# Patient Record
Sex: FEMALE | Race: BLACK OR AFRICAN AMERICAN
[De-identification: names, ages, dates, MRNs, and addresses within clinical notes are randomized per-mention and may not be internally consistent; named-entity substitution may affect disease eponyms.]

---

## 2017-03-10 ENCOUNTER — RECORD ABSTRACTING (OUTPATIENT)
Age: 57
End: 2017-03-10

## 2017-03-10 DIAGNOSIS — I10 ESSENTIAL (PRIMARY) HYPERTENSION: ICD-10-CM

## 2017-03-10 DIAGNOSIS — Z78.9 OTHER SPECIFIED HEALTH STATUS: ICD-10-CM

## 2017-03-10 DIAGNOSIS — E11.9 TYPE 2 DIABETES MELLITUS W/OUT COMPLICATIONS: ICD-10-CM

## 2017-03-10 DIAGNOSIS — Z82.49 FAMILY HISTORY OF ISCHEMIC HEART DISEASE AND OTHER DISEASES OF THE CIRCULATORY SYSTEM: ICD-10-CM

## 2017-03-10 DIAGNOSIS — Z12.4 ENCOUNTER FOR SCREENING FOR MALIGNANT NEOPLASM OF CERVIX: ICD-10-CM

## 2017-03-10 PROBLEM — Z00.00 ENCOUNTER FOR PREVENTIVE HEALTH EXAMINATION: Status: ACTIVE | Noted: 2017-03-10

## 2017-03-10 RX ORDER — INSULIN ASPART 100 [IU]/ML
INJECTION, SOLUTION INTRAVENOUS; SUBCUTANEOUS
Refills: 0 | Status: ACTIVE | COMMUNITY

## 2017-03-10 RX ORDER — HYDROCHLOROTHIAZIDE 12.5 MG/1
TABLET ORAL
Refills: 0 | Status: ACTIVE | COMMUNITY

## 2019-11-06 ENCOUNTER — OUTPATIENT (OUTPATIENT)
Dept: OUTPATIENT SERVICES | Facility: HOSPITAL | Age: 59
LOS: 1 days | Discharge: HOME | End: 2019-11-06

## 2019-11-06 VITALS
HEART RATE: 68 BPM | SYSTOLIC BLOOD PRESSURE: 113 MMHG | HEIGHT: 64 IN | OXYGEN SATURATION: 100 % | WEIGHT: 182.1 LBS | DIASTOLIC BLOOD PRESSURE: 58 MMHG | RESPIRATION RATE: 20 BRPM

## 2019-11-06 VITALS — SYSTOLIC BLOOD PRESSURE: 184 MMHG | HEART RATE: 80 BPM | DIASTOLIC BLOOD PRESSURE: 80 MMHG | RESPIRATION RATE: 17 BRPM

## 2019-11-06 LAB — GLUCOSE BLDC GLUCOMTR-MCNC: 69 MG/DL — LOW (ref 70–99)

## 2019-11-06 RX ORDER — AMLODIPINE BESYLATE 2.5 MG/1
1 TABLET ORAL
Qty: 0 | Refills: 0 | DISCHARGE

## 2019-11-06 RX ORDER — LEVOTHYROXINE SODIUM 125 MCG
1 TABLET ORAL
Qty: 0 | Refills: 0 | DISCHARGE

## 2019-11-13 DIAGNOSIS — H25.812 COMBINED FORMS OF AGE-RELATED CATARACT, LEFT EYE: ICD-10-CM

## 2019-11-13 DIAGNOSIS — E03.9 HYPOTHYROIDISM, UNSPECIFIED: ICD-10-CM

## 2019-11-13 DIAGNOSIS — E78.00 PURE HYPERCHOLESTEROLEMIA, UNSPECIFIED: ICD-10-CM

## 2019-11-13 DIAGNOSIS — I10 ESSENTIAL (PRIMARY) HYPERTENSION: ICD-10-CM

## 2023-05-16 ENCOUNTER — INPATIENT (INPATIENT)
Facility: HOSPITAL | Age: 63
LOS: 8 days | Discharge: HOME HEALTH | DRG: 917 | End: 2023-05-25
Attending: INTERNAL MEDICINE | Admitting: INTERNAL MEDICINE
Payer: COMMERCIAL

## 2023-05-16 VITALS
WEIGHT: 184.97 LBS | SYSTOLIC BLOOD PRESSURE: 183 MMHG | DIASTOLIC BLOOD PRESSURE: 80 MMHG | RESPIRATION RATE: 14 BRPM | TEMPERATURE: 98 F | HEART RATE: 75 BPM | OXYGEN SATURATION: 98 %

## 2023-05-16 DIAGNOSIS — E16.2 HYPOGLYCEMIA, UNSPECIFIED: ICD-10-CM

## 2023-05-16 PROBLEM — E03.9 HYPOTHYROIDISM, UNSPECIFIED: Chronic | Status: ACTIVE | Noted: 2019-11-06

## 2023-05-16 PROBLEM — E13.10 OTHER SPECIFIED DIABETES MELLITUS WITH KETOACIDOSIS WITHOUT COMA: Chronic | Status: ACTIVE | Noted: 2019-11-06

## 2023-05-16 PROBLEM — E78.00 PURE HYPERCHOLESTEROLEMIA, UNSPECIFIED: Chronic | Status: ACTIVE | Noted: 2019-11-06

## 2023-05-16 PROBLEM — I10 ESSENTIAL (PRIMARY) HYPERTENSION: Chronic | Status: ACTIVE | Noted: 2019-11-06

## 2023-05-16 LAB
ALBUMIN SERPL ELPH-MCNC: 3.3 G/DL — LOW (ref 3.5–5.2)
ALP SERPL-CCNC: 105 U/L — SIGNIFICANT CHANGE UP (ref 30–115)
ALT FLD-CCNC: 66 U/L — HIGH (ref 0–41)
ANION GAP SERPL CALC-SCNC: 10 MMOL/L — SIGNIFICANT CHANGE UP (ref 7–14)
APTT BLD: 35.4 SEC — SIGNIFICANT CHANGE UP (ref 27–39.2)
AST SERPL-CCNC: 73 U/L — HIGH (ref 0–41)
BASE EXCESS BLDV CALC-SCNC: -0.4 MMOL/L — SIGNIFICANT CHANGE UP (ref -2–3)
BASOPHILS # BLD AUTO: 0.02 K/UL — SIGNIFICANT CHANGE UP (ref 0–0.2)
BASOPHILS NFR BLD AUTO: 0.2 % — SIGNIFICANT CHANGE UP (ref 0–1)
BILIRUB SERPL-MCNC: <0.2 MG/DL — SIGNIFICANT CHANGE UP (ref 0.2–1.2)
BUN SERPL-MCNC: 20 MG/DL — SIGNIFICANT CHANGE UP (ref 10–20)
CA-I SERPL-SCNC: 1.28 MMOL/L — SIGNIFICANT CHANGE UP (ref 1.15–1.33)
CALCIUM SERPL-MCNC: 9 MG/DL — SIGNIFICANT CHANGE UP (ref 8.4–10.5)
CHLORIDE SERPL-SCNC: 105 MMOL/L — SIGNIFICANT CHANGE UP (ref 98–110)
CK SERPL-CCNC: 197 U/L — SIGNIFICANT CHANGE UP (ref 0–225)
CO2 SERPL-SCNC: 22 MMOL/L — SIGNIFICANT CHANGE UP (ref 17–32)
CREAT SERPL-MCNC: 0.5 MG/DL — LOW (ref 0.7–1.5)
EGFR: 105 ML/MIN/1.73M2 — SIGNIFICANT CHANGE UP
EOSINOPHIL # BLD AUTO: 0 K/UL — SIGNIFICANT CHANGE UP (ref 0–0.7)
EOSINOPHIL NFR BLD AUTO: 0 % — SIGNIFICANT CHANGE UP (ref 0–8)
GAS PNL BLDV: 137 MMOL/L — SIGNIFICANT CHANGE UP (ref 136–145)
GAS PNL BLDV: SIGNIFICANT CHANGE UP
GAS PNL BLDV: SIGNIFICANT CHANGE UP
GLUCOSE BLDC GLUCOMTR-MCNC: 122 MG/DL — HIGH (ref 70–99)
GLUCOSE BLDC GLUCOMTR-MCNC: 132 MG/DL — HIGH (ref 70–99)
GLUCOSE BLDC GLUCOMTR-MCNC: 173 MG/DL — HIGH (ref 70–99)
GLUCOSE BLDC GLUCOMTR-MCNC: 22 MG/DL — CRITICAL LOW (ref 70–99)
GLUCOSE BLDC GLUCOMTR-MCNC: 246 MG/DL — HIGH (ref 70–99)
GLUCOSE BLDC GLUCOMTR-MCNC: 25 MG/DL — CRITICAL LOW (ref 70–99)
GLUCOSE BLDC GLUCOMTR-MCNC: 31 MG/DL — CRITICAL LOW (ref 70–99)
GLUCOSE BLDC GLUCOMTR-MCNC: 59 MG/DL — LOW (ref 70–99)
GLUCOSE BLDC GLUCOMTR-MCNC: 63 MG/DL — LOW (ref 70–99)
GLUCOSE SERPL-MCNC: 148 MG/DL — HIGH (ref 70–99)
HCO3 BLDV-SCNC: 24 MMOL/L — SIGNIFICANT CHANGE UP (ref 22–29)
HCT VFR BLD CALC: 31.1 % — LOW (ref 37–47)
HCT VFR BLDA CALC: 30 % — LOW (ref 39–51)
HGB BLD CALC-MCNC: 10.1 G/DL — LOW (ref 12.6–17.4)
HGB BLD-MCNC: 9.9 G/DL — LOW (ref 12–16)
IMM GRANULOCYTES NFR BLD AUTO: 0.4 % — HIGH (ref 0.1–0.3)
INR BLD: 0.94 RATIO — SIGNIFICANT CHANGE UP (ref 0.65–1.3)
LACTATE BLDV-MCNC: 0.8 MMOL/L — SIGNIFICANT CHANGE UP (ref 0.5–2)
LACTATE SERPL-SCNC: 1.9 MMOL/L — SIGNIFICANT CHANGE UP (ref 0.7–2)
LYMPHOCYTES # BLD AUTO: 1.04 K/UL — LOW (ref 1.2–3.4)
LYMPHOCYTES # BLD AUTO: 12.6 % — LOW (ref 20.5–51.1)
MCHC RBC-ENTMCNC: 26.9 PG — LOW (ref 27–31)
MCHC RBC-ENTMCNC: 31.8 G/DL — LOW (ref 32–37)
MCV RBC AUTO: 84.5 FL — SIGNIFICANT CHANGE UP (ref 81–99)
MONOCYTES # BLD AUTO: 0.63 K/UL — HIGH (ref 0.1–0.6)
MONOCYTES NFR BLD AUTO: 7.6 % — SIGNIFICANT CHANGE UP (ref 1.7–9.3)
NEUTROPHILS # BLD AUTO: 6.56 K/UL — HIGH (ref 1.4–6.5)
NEUTROPHILS NFR BLD AUTO: 79.2 % — HIGH (ref 42.2–75.2)
NRBC # BLD: 0 /100 WBCS — SIGNIFICANT CHANGE UP (ref 0–0)
PCO2 BLDV: 38 MMHG — LOW (ref 39–42)
PH BLDV: 7.41 — SIGNIFICANT CHANGE UP (ref 7.32–7.43)
PLATELET # BLD AUTO: 244 K/UL — SIGNIFICANT CHANGE UP (ref 130–400)
PMV BLD: 9.8 FL — SIGNIFICANT CHANGE UP (ref 7.4–10.4)
PO2 BLDV: 76 MMHG — SIGNIFICANT CHANGE UP
POTASSIUM BLDV-SCNC: 3.6 MMOL/L — SIGNIFICANT CHANGE UP (ref 3.5–5.1)
POTASSIUM SERPL-MCNC: 5.6 MMOL/L — HIGH (ref 3.5–5)
POTASSIUM SERPL-SCNC: 5.6 MMOL/L — HIGH (ref 3.5–5)
PROT SERPL-MCNC: 6.2 G/DL — SIGNIFICANT CHANGE UP (ref 6–8)
PROTHROM AB SERPL-ACNC: 10.7 SEC — SIGNIFICANT CHANGE UP (ref 9.95–12.87)
RBC # BLD: 3.68 M/UL — LOW (ref 4.2–5.4)
RBC # FLD: 14.6 % — HIGH (ref 11.5–14.5)
SAO2 % BLDV: 97.3 % — SIGNIFICANT CHANGE UP
SODIUM SERPL-SCNC: 137 MMOL/L — SIGNIFICANT CHANGE UP (ref 135–146)
TROPONIN T SERPL-MCNC: <0.01 NG/ML — SIGNIFICANT CHANGE UP
WBC # BLD: 8.28 K/UL — SIGNIFICANT CHANGE UP (ref 4.8–10.8)
WBC # FLD AUTO: 8.28 K/UL — SIGNIFICANT CHANGE UP (ref 4.8–10.8)

## 2023-05-16 PROCEDURE — 97530 THERAPEUTIC ACTIVITIES: CPT | Mod: GP

## 2023-05-16 PROCEDURE — 87186 SC STD MICRODIL/AGAR DIL: CPT

## 2023-05-16 PROCEDURE — 82962 GLUCOSE BLOOD TEST: CPT

## 2023-05-16 PROCEDURE — 82140 ASSAY OF AMMONIA: CPT

## 2023-05-16 PROCEDURE — 0042T: CPT | Mod: MA

## 2023-05-16 PROCEDURE — 99222 1ST HOSP IP/OBS MODERATE 55: CPT

## 2023-05-16 PROCEDURE — 83036 HEMOGLOBIN GLYCOSYLATED A1C: CPT

## 2023-05-16 PROCEDURE — 97116 GAIT TRAINING THERAPY: CPT | Mod: GP

## 2023-05-16 PROCEDURE — 36415 COLL VENOUS BLD VENIPUNCTURE: CPT

## 2023-05-16 PROCEDURE — 82607 VITAMIN B-12: CPT

## 2023-05-16 PROCEDURE — 95819 EEG AWAKE AND ASLEEP: CPT

## 2023-05-16 PROCEDURE — 81003 URINALYSIS AUTO W/O SCOPE: CPT

## 2023-05-16 PROCEDURE — 93010 ELECTROCARDIOGRAM REPORT: CPT

## 2023-05-16 PROCEDURE — 80061 LIPID PANEL: CPT

## 2023-05-16 PROCEDURE — 83735 ASSAY OF MAGNESIUM: CPT

## 2023-05-16 PROCEDURE — 83525 ASSAY OF INSULIN: CPT

## 2023-05-16 PROCEDURE — 76705 ECHO EXAM OF ABDOMEN: CPT

## 2023-05-16 PROCEDURE — 87086 URINE CULTURE/COLONY COUNT: CPT

## 2023-05-16 PROCEDURE — 95816 EEG AWAKE AND DROWSY: CPT | Mod: 26

## 2023-05-16 PROCEDURE — 84443 ASSAY THYROID STIM HORMONE: CPT

## 2023-05-16 PROCEDURE — 70496 CT ANGIOGRAPHY HEAD: CPT | Mod: 26,MA

## 2023-05-16 PROCEDURE — 97110 THERAPEUTIC EXERCISES: CPT | Mod: GP

## 2023-05-16 PROCEDURE — 97162 PT EVAL MOD COMPLEX 30 MIN: CPT | Mod: GP

## 2023-05-16 PROCEDURE — 99223 1ST HOSP IP/OBS HIGH 75: CPT

## 2023-05-16 PROCEDURE — 80048 BASIC METABOLIC PNL TOTAL CA: CPT

## 2023-05-16 PROCEDURE — 86780 TREPONEMA PALLIDUM: CPT

## 2023-05-16 PROCEDURE — 82746 ASSAY OF FOLIC ACID SERUM: CPT

## 2023-05-16 PROCEDURE — 87077 CULTURE AEROBIC IDENTIFY: CPT

## 2023-05-16 PROCEDURE — 71045 X-RAY EXAM CHEST 1 VIEW: CPT

## 2023-05-16 PROCEDURE — 87635 SARS-COV-2 COVID-19 AMP PRB: CPT

## 2023-05-16 PROCEDURE — 70498 CT ANGIOGRAPHY NECK: CPT | Mod: 26,MA

## 2023-05-16 PROCEDURE — 85027 COMPLETE CBC AUTOMATED: CPT

## 2023-05-16 PROCEDURE — 71045 X-RAY EXAM CHEST 1 VIEW: CPT | Mod: 26

## 2023-05-16 PROCEDURE — 80053 COMPREHEN METABOLIC PANEL: CPT

## 2023-05-16 PROCEDURE — 99285 EMERGENCY DEPT VISIT HI MDM: CPT

## 2023-05-16 PROCEDURE — 84681 ASSAY OF C-PEPTIDE: CPT

## 2023-05-16 RX ORDER — LISINOPRIL 2.5 MG/1
40 TABLET ORAL DAILY
Refills: 0 | Status: DISCONTINUED | OUTPATIENT
Start: 2023-05-16 | End: 2023-05-25

## 2023-05-16 RX ORDER — ATORVASTATIN CALCIUM 80 MG/1
80 TABLET, FILM COATED ORAL AT BEDTIME
Refills: 0 | Status: DISCONTINUED | OUTPATIENT
Start: 2023-05-16 | End: 2023-05-23

## 2023-05-16 RX ORDER — INSULIN LISPRO 100/ML
VIAL (ML) SUBCUTANEOUS
Refills: 0 | Status: DISCONTINUED | OUTPATIENT
Start: 2023-05-16 | End: 2023-05-16

## 2023-05-16 RX ORDER — LEVOTHYROXINE SODIUM 125 MCG
112 TABLET ORAL DAILY
Refills: 0 | Status: DISCONTINUED | OUTPATIENT
Start: 2023-05-16 | End: 2023-05-25

## 2023-05-16 RX ORDER — SODIUM CHLORIDE 9 MG/ML
1000 INJECTION, SOLUTION INTRAVENOUS
Refills: 0 | Status: DISCONTINUED | OUTPATIENT
Start: 2023-05-16 | End: 2023-05-17

## 2023-05-16 RX ORDER — DEXTROSE 50 % IN WATER 50 %
25 SYRINGE (ML) INTRAVENOUS ONCE
Refills: 0 | Status: DISCONTINUED | OUTPATIENT
Start: 2023-05-16 | End: 2023-05-17

## 2023-05-16 RX ORDER — DEXTROSE 50 % IN WATER 50 %
50 SYRINGE (ML) INTRAVENOUS ONCE
Refills: 0 | Status: COMPLETED | OUTPATIENT
Start: 2023-05-16 | End: 2023-05-16

## 2023-05-16 RX ORDER — AMLODIPINE BESYLATE 2.5 MG/1
10 TABLET ORAL DAILY
Refills: 0 | Status: DISCONTINUED | OUTPATIENT
Start: 2023-05-16 | End: 2023-05-25

## 2023-05-16 RX ORDER — SODIUM CHLORIDE 9 MG/ML
1000 INJECTION, SOLUTION INTRAVENOUS
Refills: 0 | Status: DISCONTINUED | OUTPATIENT
Start: 2023-05-16 | End: 2023-05-16

## 2023-05-16 RX ORDER — MIDAZOLAM HYDROCHLORIDE 1 MG/ML
2 INJECTION, SOLUTION INTRAMUSCULAR; INTRAVENOUS ONCE
Refills: 0 | Status: DISCONTINUED | OUTPATIENT
Start: 2023-05-16 | End: 2023-05-16

## 2023-05-16 RX ORDER — INSULIN GLARGINE 100 [IU]/ML
30 INJECTION, SOLUTION SUBCUTANEOUS AT BEDTIME
Refills: 0 | Status: DISCONTINUED | OUTPATIENT
Start: 2023-05-16 | End: 2023-05-16

## 2023-05-16 RX ORDER — ENOXAPARIN SODIUM 100 MG/ML
40 INJECTION SUBCUTANEOUS EVERY 24 HOURS
Refills: 0 | Status: DISCONTINUED | OUTPATIENT
Start: 2023-05-16 | End: 2023-05-25

## 2023-05-16 RX ORDER — DEXTROSE 50 % IN WATER 50 %
15 SYRINGE (ML) INTRAVENOUS ONCE
Refills: 0 | Status: DISCONTINUED | OUTPATIENT
Start: 2023-05-16 | End: 2023-05-17

## 2023-05-16 RX ORDER — DEXTROSE 50 % IN WATER 50 %
12.5 SYRINGE (ML) INTRAVENOUS ONCE
Refills: 0 | Status: DISCONTINUED | OUTPATIENT
Start: 2023-05-16 | End: 2023-05-17

## 2023-05-16 RX ORDER — GLUCAGON INJECTION, SOLUTION 0.5 MG/.1ML
1 INJECTION, SOLUTION SUBCUTANEOUS ONCE
Refills: 0 | Status: DISCONTINUED | OUTPATIENT
Start: 2023-05-16 | End: 2023-05-17

## 2023-05-16 RX ADMIN — SODIUM CHLORIDE 50 MILLILITER(S): 9 INJECTION, SOLUTION INTRAVENOUS at 20:24

## 2023-05-16 RX ADMIN — ATORVASTATIN CALCIUM 80 MILLIGRAM(S): 80 TABLET, FILM COATED ORAL at 23:16

## 2023-05-16 RX ADMIN — Medication 50 MILLILITER(S): at 20:11

## 2023-05-16 RX ADMIN — ENOXAPARIN SODIUM 40 MILLIGRAM(S): 100 INJECTION SUBCUTANEOUS at 23:10

## 2023-05-16 RX ADMIN — MIDAZOLAM HYDROCHLORIDE 2 MILLIGRAM(S): 1 INJECTION, SOLUTION INTRAMUSCULAR; INTRAVENOUS at 13:30

## 2023-05-16 RX ADMIN — Medication 50 MILLILITER(S): at 19:50

## 2023-05-16 RX ADMIN — Medication 50 MILLILITER(S): at 17:02

## 2023-05-16 RX ADMIN — SODIUM CHLORIDE 100 MILLILITER(S): 9 INJECTION, SOLUTION INTRAVENOUS at 17:02

## 2023-05-16 RX ADMIN — Medication 50 MILLILITER(S): at 22:30

## 2023-05-16 NOTE — ED PROVIDER NOTE - CLINICAL SUMMARY MEDICAL DECISION MAKING FREE TEXT BOX
62 yr old f that presents with hypoglycemia, concern for CVA vs seizure. labs, ekg, imaging, neuro at bedside. consider admission. Labs and EKG were ordered and reviewed.  Imaging was ordered and reviewed by me.  Appropriate medications for patient's presenting complaints were ordered and effects were reassessed.  Patient's records (prior hospital, ED visit, and/or nursing home notes if available) were reviewed.  Additional history was obtained from EMS, family, and/or PCP (where available).  Escalation to admission/observation was considered.  Patient requires inpatient hospitalization -telemetry

## 2023-05-16 NOTE — CONSULT NOTE ADULT - ASSESSMENT
Assessment:  62y Female with PMHx of DM, hypothyroidism who presented to the ED due to unresponsiveness in the setting of hypoglycemia, stroke code called in the ED due to confusion even with improvement of blood sugar. The patient's  saw her normal at 5am this morning when he checked her blood sugar which was 80 gave her insulin & levothyroxine and when he came back at 1130am she was unresponsive and foaming from the mouth. NIHSS 5 for level of alertness, orientation, commands, dysarthria, right gaze spontaneously overcome. 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 Tenecteplase not administered as patient out of the window and based on history- more likely seizure in the setting of hypoglycemia.    Recommendations:  - routine EEG   - seizure precautions  - no further stroke workup warranted at this time    Case discussed with Dr. Calix.

## 2023-05-16 NOTE — H&P ADULT - ASSESSMENT
HPI: 62y Female with PMHx of Advanced Dementia, DM, hypothyroidism, HTN who presented to the ED due to unresponsiveness in the setting of hypoglycemia, stroke code called in the ED due to confusion even with improvement of blood sugar. The patient's  saw her normal at 5am this morning when he checked her blood sugar which was 80 gave her insulin & levothyroxine and when he came back at 1130am she was unresponsive and foaming from the mouth. Checked her blood sugar which was in the 30s at this time so he called 911. When EMS arrived patient was diaphoretic and cold to the touch, FS was 47 at this time, therefore, dextrose and orange juice were given. Patient became more awake, however, confused and agitated.  states that she has had issues with hypoglycemia in the past, however, this is the most unresponsive she has been secondary to hypoglycemia. No history of seizures.   On arrival to the ED, patient moving all extremities antigravity with right gaze preference which spontaneously overcome. Patient required 2mg of Versed to tolerate CT scan.      In the ER:  Vitals: /80, HR 75, RR 14, T 36.8 oral, SpO2 98% RA  EKG: Sinus Rythm with 1st degree AV block   Sig Labs: Hgb 9.9, Cr 0.5, Trop -ve   Imagin) CXR Left lung opacification is felt to be artifactual in nature. However, a true frontal x-ray is recommended for further evaluation  2) CT Head + Angio: No acute infarct     # Syncope in setting of hypoglycemia   # IDDM  # Hypothyroidism   # HTN  # Advanced Dementia   - Patient clinically and hemodynamically stable on admission   - Baseline AOx2, seeking placement from family's end   - PT consult,  consult   - Workup negative for acute stroke   - EEG per neurology   - C/w home insulin 30 at bedtime + SS  - A1C, TSH, Lipid Profile in AM   - Fall precautions   - C/w home amlodipine 10, Levothyroxine 112, Lisinopril 40, and atorvastatin 80 QD      # DVT prophylaxis - Lovenox 40 QD   # GI prophylaxis  - Not Indicated   # Diet - DASH/TLC/CC  # Activity Score (AM-PAC) - IAT  # Code status - Full   # Disposition

## 2023-05-16 NOTE — ED ADULT TRIAGE NOTE - CHIEF COMPLAINT QUOTE
pt co confusion and hypoglycemia, on scene 48 d50 given came up to 168, in triage 192 . pt continues to be confused , LKW 5 am code stroke initiated in triage

## 2023-05-16 NOTE — H&P ADULT - NSHPPHYSICALEXAM_GEN_ALL_CORE
Constitutional: confused, no acute distress  HEENT: Airway patent, moist MM,   CV: regular rate, regular rhythm, well-perfused extremities,   Lungs: Clear to ascultation bilaterally, no increased work of breathing.  ABD: Non-tender, Non-distended,   MSK: Neck supple, nontender, normal range of motion, moving all extremities spontaneously  INTEG: Skin warm, dry, no rash.  NEURO: AAO x 2 to person and place, (+) slurred speech, no facial asymmetry, negative pronator drift, R gaze palsy,

## 2023-05-16 NOTE — ED PROVIDER NOTE - PROGRESS NOTE DETAILS
AE: Additional history provided by .  cares for the patient; she is unable to complete ADLs. Patient was found to have glucose of 80 last night and was then given 4 units of insulin and dinner but the patient did not eat much. This morning after the  returned from work, he found her to be less responsive and called EMS who found her to be hypoglycemic. ER: pts , Araceli Stewart can be reached at [phone] AE: Repeat fingerstick is 31. Will give 1 amp D50 and start D5+0.5NS. MAR made aware.

## 2023-05-16 NOTE — H&P ADULT - ATTENDING COMMENTS
***HX and physical limited due to pt being a poor historian. Supplemental information obtained from family, house staff, and EMR      63 YO F w/ a PMH of Advanced Dementia, DM2, hypothyroidism, and HTN who was BIBEMS for eval of nonresponsiveness that started this AM. As per , she was normal this AM and so he administered her Insulin and Levothyroxine at that time. When he returned, pt was altered. Unable to obtain ROS.     Of note, when EMS arrived pt's FS was in 30s and she was given D50.     In the ED, STROKE CODE activation for NIHSS of 5. Imaging wqas negative for acute process. No tPA administered. Neurology is asking for EEG and seizure precautions.     FMHx:   -No family Hx of early cardiac death, CAD, asthma, or genetic disorders identified    Physical exam shows pt in NAD. VSS, afebrile, not hypoxic on RA. A&Ox2 (Name/Place, knows day of the week but not month/year). Neuro exam without deficits, motor/sensory intact, no dysarthria, no facial asymmetry. Muscle strength/sensation intact. CTA B/L with no W/C/R. RRR, no M/G/R. ABD is soft and non-tender, normoactive BSs. LEs without swelling. No rashes. Labs and radiology as above.     Hypoglycemic encephalopathy (resolved) vs hypoglycemic seizure. Currently at baseline. Send EEG. Obtain UA . Send A1c, lipids, and TSH.   -UPDATE at : I was called to bedside by RN. Pt again altered, FS was 22. STAT D50 x2 given. Additional IV access obtained. Started pt on D5+LR at 100 cc/hr. Pt returned to baseline. Hold insulin for now.     Normocytic anemia, unknown baseline. Send anemia work-up. Replace as necessary.     Hypoalbuminemia, from poor oral intake. Nutrition eval.     Hx of Advanced Dementia, hypothyroidism, and HTN. Restart home meds, except as stated above. DVT PPX. Inform PCP of pt's admission to hospital. My note supersedes the residents note.     Date seen by [email or web address]

## 2023-05-16 NOTE — H&P ADULT - HISTORY OF PRESENT ILLNESS
HPI: 62y Female with PMHx of Advanced Dementia, DM, hypothyroidism, HTN who presented to the ED due to unresponsiveness in the setting of hypoglycemia, stroke code called in the ED due to confusion even with improvement of blood sugar. The patient's  saw her normal at 5am this morning when he checked her blood sugar which was 80 gave her insulin & levothyroxine and when he came back at 1130am she was unresponsive and foaming from the mouth. Checked her blood sugar which was in the 30s at this time so he called 911. When EMS arrived patient was diaphoretic and cold to the touch, FS was 47 at this time, therefore, dextrose and orange juice were given. Patient became more awake, however, confused and agitated.  states that she has had issues with hypoglycemia in the past, however, this is the most unresponsive she has been secondary to hypoglycemia. No history of seizures.   On arrival to the ED, patient moving all extremities antigravity with right gaze preference which spontaneously overcome. Patient required 2mg of Versed to tolerate CT scan.      In the ER:  Vitals: /80, HR 75, RR 14, T 36.8 oral, SpO2 98% RA  EKG: Sinus Rythm with 1st degree AV block   Sig Labs: Hgb 9.9, Cr 0.5, Trop -ve   Imagin) CXR Left lung opacification is felt to be artifactual in nature. However, a true frontal x-ray is recommended for further evaluation  2) CT Head + Angio: No acute infarct

## 2023-05-16 NOTE — ED PROVIDER NOTE - ATTENDING CONTRIBUTION TO CARE
62-year-old female with a past medical history significant for hypothyroidism lipidemia hypertension and diabetes who presents with change in mental status.  As per EMS patient's  left for work at approximately 8 5 AM and patient was at her baseline however he returned at 1130 was noted to be unresponsive in bed.  EMS obtained a fingerstick which was 40 given D50 in the field with improvement in mental status.  Patient called as a stroke notification.  Of note in CAT scan patient's confusion was 20 if not following commands became agitated activated given Versed 2 mg.      VITAL SIGNS: I have reviewed nursing notes and confirm.  CONSTITUTIONAL: non-toxic, well appearing  SKIN: no rash, no petechiae.  EYES: EOMI, pink conjunctiva, anicteric  ENT: tongue midline, no exudates, MMM  NECK: Supple; no meningismus, no JVD  CARD: RRR, no murmurs, equal radial pulses bilaterally 2+  RESP: CTAB, no respiratory distress  ABD: Soft, non-tender, non-distended, no peritoneal signs, no HSM, no CVA tenderness  EXT: Normal ROM x4. No edema. No calves tenderness  NEURO: Alert, oriented x2 (person and place)     62 yr old f that presents with hypoglycemia, concern for CVA vs seizure. labs, ekg, imaging, neuro at bedside. consider admission.

## 2023-05-16 NOTE — H&P ADULT - NSICDXPASTMEDICALHX_GEN_ALL_CORE_FT
PAST MEDICAL HISTORY:  Adult hypothyroidism     Asymptomatic hypertension     Atypical diabetes mellitus     Borderline hypercholesterolemia

## 2023-05-16 NOTE — CONSULT NOTE ADULT - SUBJECTIVE AND OBJECTIVE BOX
**STROKE CODE CONSULT NOTE**    Last known well time/Time of onset of symptoms:    HPI: 62y Female with PMHx of     T(C): 36.8 (05-16-23 @ 13:03), Max: 36.8 (05-16-23 @ 13:03)  HR: 76 (05-16-23 @ 13:40) (75 - 76)  BP: 167/67 (05-16-23 @ 13:40) (167/67 - 183/80)  RR: 16 (05-16-23 @ 13:40) (14 - 16)  SpO2: 98% (05-16-23 @ 13:40) (98% - 98%)    PAST MEDICAL & SURGICAL HISTORY:  Asymptomatic hypertension      Borderline hypercholesterolemia      Adult hypothyroidism      Atypical diabetes mellitus          FAMILY HISTORY:      SOCIAL HISTORY:  Denies smoking, drinking, or drug use    ROS: ***  Constitutional: No fever, weight loss or fatigue  Eyes: No eye pain, visual disturbances, or discharge  ENMT:  No difficulty hearing, tinnitus, vertigo; No sinus or throat pain  Neck: No pain or stiffness  Respiratory: No cough, wheezing, chills or hemoptysis  Cardiovascular: No chest pain, palpitations, shortness of breath, dizziness or leg swelling  Gastrointestinal: No abdominal pain. No nausea, vomiting or hematemesis; No diarrhea or constipation. Nohematochezia.  Genitourinary: No dysuria, frequency, hematuria or incontinence  Neurological: As per HPI  Skin: No itching, burning, rashes or lesions   Endocrine: No heat or cold intolerance; No hair loss  Musculoskeletal: No joint pain or swelling; No muscle, back or extremity pain  Psychiatric: No depression, anxiety, mood swings or difficulty sleeping  Heme/Lymph: No easy bruising or bleeding gums    MEDICATIONS  (STANDING):    MEDICATIONS  (PRN):    Allergies    No Known Allergies    Intolerances      Vital Signs Last 24 Hrs  T(C): 36.8 (16 May 2023 13:03), Max: 36.8 (16 May 2023 13:03)  T(F): 98.3 (16 May 2023 13:03), Max: 98.3 (16 May 2023 13:03)  HR: 76 (16 May 2023 13:40) (75 - 76)  BP: 167/67 (16 May 2023 13:40) (167/67 - 183/80)  BP(mean): --  RR: 16 (16 May 2023 13:40) (14 - 16)  SpO2: 98% (16 May 2023 13:40) (98% - 98%)    Parameters below as of 16 May 2023 13:40  Patient On (Oxygen Delivery Method): room air        Physical exam:  General: No acute distress, awake and alert  Cardiovascular: Regular rate and rhythm, no murmurs, rubs, or gallops. No bruits  Pulmonary: Anterior breath sounds clear bilaterally, no crackles or wheezing. No use of accessory muscles  Extremities: Radial and DP pulses +2, no edema    Neurologic:  -Mental status: Awake, alert, oriented to person, place, and time. Speech is fluent with intact naming, repetition, and comprehension, no dysarthria. Recent and remote memory intact. Follows commands. Attention/concentration intact. Fund of knowledge appropriate.  -Cranial nerves:   II: Visual fields are full to confrontation.  III, IV, VI: Extraocular movements are intact without nystagmus. Pupils equally round and reactive to light  V:  Facial sensation V1-V3 equal and intact   VII: Face is symmetric with normal eye closure and smile  VIII: Hearing is bilaterally intact to finger rub  IX, X: Uvula is midline and soft palate rises symmetrically  XI: Head turning and shoulder shrug are intact.  XII: Tongue protrudes midline  Motor: Normal bulk and tone. No pronator drift. Strength bilateral upper extremity 5/5, bilateral lower extremities 5/5.  Rapid alternating movements intact and symmetric  Sensation: Intact to light touch bilaterally. No neglect or extinction on double simultaneous testing.  Coordination: No dysmetria on finger-to-nose and heel-to-shin bilaterally  Reflexes: Downgoing toes bilaterally   Gait: Narrow gait and steady    NIHSS: ****  ICH: *****  GCS: *****  ASPECTS Score: ****    Fingerstick Blood Glucose: CAPILLARY BLOOD GLUCOSE  192 (16 May 2023 14:31)      POCT Blood Glucose.: 152 mg/dL (16 May 2023 13:48)    LABS:                    RADIOLOGY & ADDITIONAL STUDIES:    HCT:   CT Brain Stroke Protocol (05.16.23 @ 13:34) >    IMPRESSION:    1.  Study limited by motion artifact.    2.  No acute intracranial hemorrhage or acute large territorial infarct.    3.  Cerebral atrophy.    4.  Periventricular white matter hypodensities, nonspecific differential   diagnostic possibilities include ischemic change, gliosis or   demyelination.    5.  Prominent sulcus in the left parietal region may represent focal   atrophy or chronic infarct.    CTA:    CTP:      -----------------------------------------------------------------------------------------------------------------  IV-tenecetplase(Y/N):    ***                              Bolus time:  Reason IV-tenecetplase not given:       ASSESSMENT/PLAN:    62y Female w/ PMH ***. HCT showed ***. CTA showed ***. Given *** tenecetplase was ***.         **STROKE CODE CONSULT NOTE**    Last known well time: 5:00am this morning    HPI: 62y Female with PMHx of DM, hypothyroidism who presented to the ED due to unresponsiveness in the setting of hypoglycemia, stroke code called in the ED due to confusion even with improvement of blood sugar. The patient's  saw her normal at 5am this morning when he checked her blood sugar which was 80 gave her insulin & levothyroxine and when he came back at 1130am she was unresponsive and foaming from the mouth. Checked her blood sugar which was in the 30s at this time so he called 911. When EMS arrived patient was diaphoretic and cold to the touch, FS was 47 at this time, therefore, dextrose and orange juice were given. Patient became more awake, however, confused and agitated.  states that she has had issues with hypoglycemia in the past, however, this is the most unresponsive she has been secondary to hypoglycemia. No history of seizures.   On arrival to the ED, patient moving all extremities antigravity with right gaze preference which spontaneously overcome. Patient required 2mg of Versed to tolerate CT scan.      T(C): 36.8 (05-16-23 @ 13:03), Max: 36.8 (05-16-23 @ 13:03)  HR: 76 (05-16-23 @ 13:40) (75 - 76)  BP: 167/67 (05-16-23 @ 13:40) (167/67 - 183/80)  RR: 16 (05-16-23 @ 13:40) (14 - 16)  SpO2: 98% (05-16-23 @ 13:40) (98% - 98%)    PAST MEDICAL & SURGICAL HISTORY:  Asymptomatic hypertension      Borderline hypercholesterolemia      Adult hypothyroidism      Atypical diabetes mellitus          FAMILY HISTORY:      SOCIAL HISTORY:  unknown     ROS:   unable to obtain from patient     MEDICATIONS  (STANDING):    MEDICATIONS  (PRN):    Allergies    No Known Allergies    Intolerances      Vital Signs Last 24 Hrs  T(C): 36.8 (16 May 2023 13:03), Max: 36.8 (16 May 2023 13:03)  T(F): 98.3 (16 May 2023 13:03), Max: 98.3 (16 May 2023 13:03)  HR: 76 (16 May 2023 13:40) (75 - 76)  BP: 167/67 (16 May 2023 13:40) (167/67 - 183/80)  BP(mean): --  RR: 16 (16 May 2023 13:40) (14 - 16)  SpO2: 98% (16 May 2023 13:40) (98% - 98%)    Parameters below as of 16 May 2023 13:40  Patient On (Oxygen Delivery Method): room air        Physical exam:  General: No acute distress, awake and alert    Neurologic:  -Mental status: requires repetitive stimulation to attend, eyes open, intermittently following simple commands, unable to name objects, oriented to person and hospital, disorientated to age and month. Mildly dysarthric.  -Cranial nerves:   II: Visual fields are full to confrontation.  III, IV, VI: prefers right with gaze, however, spontaneously overcomes  V:  Facial sensation V1-V3 equal and intact   VII: Face is symmetric with normal eye closure and smile  Motor:  Strength bilateral upper extremity 5/5, bilateral lower extremities 5/5.  Sensation: Intact to light touch bilaterally. No neglect or extinction on double simultaneous testing.  Coordination: unable to assess as patient does not follow command    NIHSS: 5  ASPECTS Score: 7    Fingerstick Blood Glucose: CAPILLARY BLOOD GLUCOSE  192 (16 May 2023 14:31)      POCT Blood Glucose.: 152 mg/dL (16 May 2023 13:48)    LABS:                    RADIOLOGY & ADDITIONAL STUDIES:    HCT:   CT Brain Stroke Protocol (05.16.23 @ 13:34) >    IMPRESSION:    1.  Study limited by motion artifact.    2.  No acute intracranial hemorrhage or acute large territorial infarct.    3.  Cerebral atrophy.    4.  Periventricular white matter hypodensities, nonspecific differential   diagnostic possibilities include ischemic change, gliosis or   demyelination.    5.  Prominent sulcus in the left parietal region may represent focal   atrophy or chronic infarct.    < from: CT Angio Brain Stroke Protocol  w/ IV Cont (05.16.23 @ 14:01) >    CT PERFUSION:    Tmax greater than 6 seconds 3 mL in the left cerebellar hemisphere may   represent ischemic penumbra, follow-up MRI of the brain is suggested if   not contraindicated in this patient.    CTA HEAD/NECK:    Likely occlusion of the left vertebral artery 2 cm distal to its origin   with reconstitution at the level of C2, two short segments of severe   stenosis involving the intracranial left vertebral artery.    < end of copied text >      -----------------------------------------------------------------------------------------------------------------  IV-tenecetplase(Y/N):    no                          Reason IV-tenecetplase not given: out of the window, more likely seizure in the setting of hypoglycemia

## 2023-05-16 NOTE — ED PROVIDER NOTE - PHYSICAL EXAMINATION
Vital Signs: I have reviewed the initial vital signs.  Constitutional: confused, no acute distress  HEENT: Airway patent, moist MM,   CV: regular rate, regular rhythm, well-perfused extremities,   Lungs: Clear to ascultation bilaterally, no increased work of breathing.  ABD: Non-tender, Non-distended,   MSK: Neck supple, nontender, normal range of motion, moving all extremities spontaneously  INTEG: Skin warm, dry, no rash.  NEURO: AAO x 2 to person and place, (+) slurred speech, no facial asymmetry, negative pronator drift, R gaze palsy,

## 2023-05-16 NOTE — CONSULT NOTE ADULT - NS ATTEND AMEND GEN_ALL_CORE FT
Pt is a 61 yo F with PMHx of DM, hypothyroidism, who presents with hypoglycemia and unresponsiveness, concern for seizure-like activity. POC glucose 36 at home as per pt's . pt appeared post ictal on arrival to ED. CT head without acute process. CTA head/neck with left VA occlusion, likely incidental finding, however recommend ASA/statin. Concern for possible provoked seizure int he setting of severe hypoglycemia. Recommend rEEG, seizure precautions, DM management as per primary team. May consider MRI brain should concern for stroke persist once pt's mentation is improved.

## 2023-05-16 NOTE — ED PROVIDER NOTE - OBJECTIVE STATEMENT
62-year-old female brought in by EMS for evaluation of confusion and hypoglycemia.  Per EMS, patient's  left patient this morning before he went to work at 5 AM she was in her usual state of health.  He arrived at 1130 and noted patient was unresponsive in the bed.  EMS arrived fingerstick 40, IV access obtained and given dextrose.  Mental status improved, repeat fingerstick 190, however patient still not behaving at her baseline, confused and not really following commands.  Code stroke called in triage.

## 2023-05-16 NOTE — H&P ADULT - NSHPLABSRESULTS_GEN_ALL_CORE
9.9    8.28  )-----------( 244      ( 16 May 2023 14:15 )             31.1     05-16    137  |  105  |  20  ----------------------------<  148<H>  5.6<H>   |  22  |  0.5<L>    Ca    9.0      16 May 2023 14:15    TPro  6.2  /  Alb  3.3<L>  /  TBili  <0.2  /  DBili  x   /  AST  73<H>  /  ALT  66<H>  /  AlkPhos  105  05-16    PT/INR - ( 16 May 2023 14:15 )   PT: 10.70 sec;   INR: 0.94 ratio         PTT - ( 16 May 2023 14:15 )  PTT:35.4 sec      Lactate, Blood: 1.9 mmol/L (05-16-23 @ 14:15)  Troponin T, Serum: <0.01 ng/mL (05-16-23 @ 14:15)  Creatine Kinase, Serum: 197 U/L (05-16-23 @ 14:15)          CARDIAC MARKERS ( 16 May 2023 14:15 )  x     / <0.01 ng/mL / 197 U/L / x     / x

## 2023-05-17 LAB
APPEARANCE UR: CLEAR — SIGNIFICANT CHANGE UP
BILIRUB UR-MCNC: NEGATIVE — SIGNIFICANT CHANGE UP
C PEPTIDE SERPL-MCNC: 0.1 NG/ML — LOW (ref 1.1–4.4)
COLOR SPEC: COLORLESS — SIGNIFICANT CHANGE UP
DIFF PNL FLD: NEGATIVE — SIGNIFICANT CHANGE UP
GLUCOSE BLDC GLUCOMTR-MCNC: 107 MG/DL — HIGH (ref 70–99)
GLUCOSE BLDC GLUCOMTR-MCNC: 116 MG/DL — HIGH (ref 70–99)
GLUCOSE BLDC GLUCOMTR-MCNC: 152 MG/DL — HIGH (ref 70–99)
GLUCOSE BLDC GLUCOMTR-MCNC: 158 MG/DL — HIGH (ref 70–99)
GLUCOSE BLDC GLUCOMTR-MCNC: 164 MG/DL — HIGH (ref 70–99)
GLUCOSE BLDC GLUCOMTR-MCNC: 167 MG/DL — HIGH (ref 70–99)
GLUCOSE BLDC GLUCOMTR-MCNC: 182 MG/DL — HIGH (ref 70–99)
GLUCOSE BLDC GLUCOMTR-MCNC: 203 MG/DL — HIGH (ref 70–99)
GLUCOSE BLDC GLUCOMTR-MCNC: 215 MG/DL — HIGH (ref 70–99)
GLUCOSE BLDC GLUCOMTR-MCNC: 252 MG/DL — HIGH (ref 70–99)
GLUCOSE BLDC GLUCOMTR-MCNC: 44 MG/DL — CRITICAL LOW (ref 70–99)
GLUCOSE BLDC GLUCOMTR-MCNC: 45 MG/DL — CRITICAL LOW (ref 70–99)
GLUCOSE BLDC GLUCOMTR-MCNC: 49 MG/DL — CRITICAL LOW (ref 70–99)
GLUCOSE BLDC GLUCOMTR-MCNC: 64 MG/DL — LOW (ref 70–99)
GLUCOSE BLDC GLUCOMTR-MCNC: 67 MG/DL — LOW (ref 70–99)
GLUCOSE BLDC GLUCOMTR-MCNC: 68 MG/DL — LOW (ref 70–99)
GLUCOSE BLDC GLUCOMTR-MCNC: 90 MG/DL — SIGNIFICANT CHANGE UP (ref 70–99)
GLUCOSE BLDC GLUCOMTR-MCNC: 95 MG/DL — SIGNIFICANT CHANGE UP (ref 70–99)
GLUCOSE BLDC GLUCOMTR-MCNC: 96 MG/DL — SIGNIFICANT CHANGE UP (ref 70–99)
GLUCOSE BLDC GLUCOMTR-MCNC: 97 MG/DL — SIGNIFICANT CHANGE UP (ref 70–99)
GLUCOSE BLDC GLUCOMTR-MCNC: 97 MG/DL — SIGNIFICANT CHANGE UP (ref 70–99)
GLUCOSE BLDC GLUCOMTR-MCNC: >600 MG/DL — CRITICAL HIGH (ref 70–99)
GLUCOSE UR QL: ABNORMAL
INSULIN SERPL-MCNC: 4.4 UU/ML — SIGNIFICANT CHANGE UP (ref 2.6–24.9)
KETONES UR-MCNC: NEGATIVE — SIGNIFICANT CHANGE UP
LEUKOCYTE ESTERASE UR-ACNC: NEGATIVE — SIGNIFICANT CHANGE UP
NITRITE UR-MCNC: NEGATIVE — SIGNIFICANT CHANGE UP
PH UR: 6.5 — SIGNIFICANT CHANGE UP (ref 5–8)
PROT UR-MCNC: NEGATIVE — SIGNIFICANT CHANGE UP
SP GR SPEC: 1.02 — SIGNIFICANT CHANGE UP (ref 1.01–1.03)
UROBILINOGEN FLD QL: SIGNIFICANT CHANGE UP

## 2023-05-17 PROCEDURE — 99291 CRITICAL CARE FIRST HOUR: CPT

## 2023-05-17 RX ORDER — NIFEDIPINE 30 MG
10 TABLET, EXTENDED RELEASE 24 HR ORAL ONCE
Refills: 0 | Status: COMPLETED | OUTPATIENT
Start: 2023-05-17 | End: 2023-05-17

## 2023-05-17 RX ORDER — DEXTROSE 50 % IN WATER 50 %
50 SYRINGE (ML) INTRAVENOUS ONCE
Refills: 0 | Status: COMPLETED | OUTPATIENT
Start: 2023-05-17 | End: 2023-05-17

## 2023-05-17 RX ORDER — QUETIAPINE FUMARATE 200 MG/1
12.5 TABLET, FILM COATED ORAL ONCE
Refills: 0 | Status: COMPLETED | OUTPATIENT
Start: 2023-05-17 | End: 2023-05-17

## 2023-05-17 RX ORDER — DEXTROSE 50 % IN WATER 50 %
25 SYRINGE (ML) INTRAVENOUS ONCE
Refills: 0 | Status: COMPLETED | OUTPATIENT
Start: 2023-05-17 | End: 2023-05-17

## 2023-05-17 RX ADMIN — Medication 112 MICROGRAM(S): at 05:15

## 2023-05-17 RX ADMIN — Medication 50 MILLILITER(S): at 07:00

## 2023-05-17 RX ADMIN — QUETIAPINE FUMARATE 12.5 MILLIGRAM(S): 200 TABLET, FILM COATED ORAL at 22:12

## 2023-05-17 RX ADMIN — SODIUM CHLORIDE 125 MILLILITER(S): 9 INJECTION, SOLUTION INTRAVENOUS at 04:55

## 2023-05-17 RX ADMIN — Medication 50 MILLILITER(S): at 01:03

## 2023-05-17 RX ADMIN — ENOXAPARIN SODIUM 40 MILLIGRAM(S): 100 INJECTION SUBCUTANEOUS at 22:12

## 2023-05-17 RX ADMIN — LISINOPRIL 40 MILLIGRAM(S): 2.5 TABLET ORAL at 05:15

## 2023-05-17 RX ADMIN — SODIUM CHLORIDE 75 MILLILITER(S): 9 INJECTION, SOLUTION INTRAVENOUS at 00:56

## 2023-05-17 RX ADMIN — Medication 10 MILLIGRAM(S): at 22:12

## 2023-05-17 RX ADMIN — AMLODIPINE BESYLATE 10 MILLIGRAM(S): 2.5 TABLET ORAL at 05:15

## 2023-05-17 RX ADMIN — ATORVASTATIN CALCIUM 80 MILLIGRAM(S): 80 TABLET, FILM COATED ORAL at 22:12

## 2023-05-17 RX ADMIN — SODIUM CHLORIDE 125 MILLILITER(S): 9 INJECTION, SOLUTION INTRAVENOUS at 11:05

## 2023-05-17 RX ADMIN — SODIUM CHLORIDE 100 MILLILITER(S): 9 INJECTION, SOLUTION INTRAVENOUS at 04:06

## 2023-05-17 RX ADMIN — Medication 25 GRAM(S): at 09:30

## 2023-05-17 RX ADMIN — Medication 50 MILLILITER(S): at 05:07

## 2023-05-17 NOTE — ED ADULT NURSE NOTE - NSFALLHARMRISKINTERV_ED_ALL_ED
Assistance OOB with selected safe patient handling equipment if applicable/Assistance with ambulation/Communicate risk of Fall with Harm to all staff, patient, and family/Monitor gait and stability/Monitor for mental status changes and reorient to person, place, and time, as needed/Provide visual cue: red socks, yellow wristband, yellow gown, etc/Reinforce activity limits and safety measures with patient and family/Toileting schedule using arm’s reach rule for commode and bathroom/Use of alarms - bed, stretcher, chair and/or video monitoring/Bed in lowest position, wheels locked, appropriate side rails in place/Call bell, personal items and telephone in reach/Instruct patient to call for assistance before getting out of bed/chair/stretcher/Non-slip footwear applied when patient is off stretcher/[place] to call system/Physically safe environment - no spills, clutter or unnecessary equipment/Purposeful Proactive Rounding/Room/bathroom lighting operational, light cord in reach

## 2023-05-17 NOTE — PHYSICAL THERAPY INITIAL EVALUATION ADULT - PERTINENT HX OF CURRENT PROBLEM, REHAB EVAL
2y Female with PMHx of Advanced Dementia, DM, hypothyroidism, HTN who presented to the ED due to unresponsiveness in the setting of hypoglycemia, stroke code called in the ED due to confusion even with improvement of blood sugar. The patient's  saw her normal at 5am this morning when he checked her blood sugar which was 80 gave her insulin & levothyroxine and when he came back at 1130am she was unresponsive and foaming from the mouth. Checked her blood sugar which was in the 30s at this time so he called 911. When EMS arrived patient was diaphoretic and cold to the touch, FS was 47 at this time, therefore, dextrose and orange juice were given. Patient became more awake, however, confused and agitated.  states that she has had issues with hypoglycemia in the past, however, this is the most unresponsive she has been secondary to hypoglycemia. No history of seizures.   On arrival to the ED, patient moving all extremities antigravity with right gaze preference which spontaneously overcome. Patient required 2mg of Versed to tolerate CT scan.

## 2023-05-17 NOTE — PATIENT PROFILE ADULT - FALL HARM RISK - HARM RISK INTERVENTIONS

## 2023-05-17 NOTE — PHYSICAL THERAPY INITIAL EVALUATION ADULT - GENERAL OBSERVATIONS, REHAB EVAL
pt seen in CCU. [id number] pm. 63 y/o F received/left in bed, nad, + tele, fluctuating restlessness. pt appears alert and confused, following VC's for simple mobility. oriented to "hospital" and own name, not the date or . pt sat up at EOB with mod A, stood up with mod A, and side stepped 6 ft with mod A, decreased safety awareness and + impulsivity. tends to slide to the left side of the bed. RT gaze preference, but able to visually track to LT when visually prompted. vitals: 144/73 68 99% on RA.

## 2023-05-17 NOTE — CHART NOTE - NSCHARTNOTEFT_GEN_A_CORE
Transfer Note    Transfer from: Med tele  Transfer to: ICU    HPI: 62y Female with PMHx of Advanced Dementia, DM, hypothyroidism, HTN who presented to the ED due to unresponsiveness in the setting of hypoglycemia, stroke code called in the ED due to confusion even with improvement of blood sugar. The patient's  saw her normal at 5am this morning when he checked her blood sugar which was 80 gave her insulin & levothyroxine and when he came back at 1130am she was unresponsive and foaming from the mouth. Checked her blood sugar which was in the 30s at this time so he called 911. When EMS arrived patient was diaphoretic and cold to the touch, FS was 47 at this time, therefore, dextrose and orange juice were given. Patient became more awake, however, confused and agitated.  states that she has had issues with hypoglycemia in the past, however, this is the most unresponsive she has been secondary to hypoglycemia. No history of seizures.   On arrival to the ED, patient moving all extremities antigravity with right gaze preference which spontaneously overcome. Patient required 2mg of Versed to tolerate CT scan.      In the ER:  Vitals: /80, HR 75, RR 14, T 36.8 oral, SpO2 98% RA  EKG: Sinus Rythm with 1st degree AV block   Sig Labs: Hgb 9.9, Cr 0.5, Trop -ve   Imagin) CXR Left lung opacification is felt to be artifactual in nature. However, a true frontal x-ray is recommended for further evaluation  2) CT Head + Angio: No acute infarct     COURSE:  Since admission pt's blood glucose levels consistently low despite multiple amps of D50 and D5 infusion.    Per admission med rec, pt on Novolog at home. Possible accidental exogenous insulin overdose at home prior to presentation.    Placed Endocrine consult  Discontinued Lantus order (pt did not receive any insulin doses while inpatient)  Multiple amps D50 given  [place] serum insulin and c-peptide while FSG was 59 prior to administering subsequent D50 dose.  Started pt on D10 infusion.  Increased D10 infusion to 100cc/hr    Pt upgraded to ICU for q1h FSG checks.      VITAL SIGNS: Last 24 Hours  T(C): 36.6 (16 May 2023 23:59), Max: 36.8 (16 May 2023 13:03)  T(F): 97.8 (16 May 2023 23:59), Max: 98.3 (16 May 2023 13:03)  HR: 60 (16 May 2023 23:59) (60 - 76)  BP: 159/65 (16 May 2023 23:59) (118/59 - 183/80)  BP(mean): 93 (16 May 2023 23:59) (93 - 93)  RR: 18 (16 May 2023 23:59) (14 - 19)  SpO2: 96% (16 May 2023 23:59) (96% - 100%)    MEDICATIONS:  STANDING MEDICATIONS  amLODIPine   Tablet 10 milliGRAM(s) Oral daily  atorvastatin 80 milliGRAM(s) Oral at bedtime  dextrose 10% + sodium chloride 0.45%. 1000 milliLiter(s) IV Continuous <Continuous>  dextrose 5%. 1000 milliLiter(s) IV Continuous <Continuous>  dextrose 5%. 1000 milliLiter(s) IV Continuous <Continuous>  dextrose 50% Injectable 12.5 Gram(s) IV Push once  dextrose 50% Injectable 25 Gram(s) IV Push once  dextrose 50% Injectable 25 Gram(s) IV Push once  enoxaparin Injectable 40 milliGRAM(s) SubCutaneous every 24 hours  glucagon  Injectable 1 milliGRAM(s) IntraMuscular once  levothyroxine 112 MICROGram(s) Oral daily  lisinopril 40 milliGRAM(s) Oral daily    PRN MEDICATIONS  dextrose Oral Gel 15 Gram(s) Oral once PRN    LABS:                        9.9    8.28  )-----------( 244      ( 16 May 2023 14:15 )             31.1     05-16    137  |  105  |  20  ----------------------------<  148<H>  5.6<H>   |  22  |  0.5<L>    Ca    9.0      16 May 2023 14:15    TPro  6.2  /  Alb  3.3<L>  /  TBili  <0.2  /  DBili  x   /  AST  73<H>  /  ALT  66<H>  /  AlkPhos  105  05-16    PT/INR - ( 16 May 2023 14:15 )   PT: 10.70 sec;   INR: 0.94 ratio    PTT - ( 16 May 2023 14:15 )  PTT:35.4 sec    CARDIAC MARKERS ( 16 May 2023 14:15 )  x     / <0.01 ng/mL / 197 U/L / x     / x          RADIOLOGY:  < from: Xray Chest 1 View-PORTABLE IMMEDIATE (Xray Chest 1 View-PORTABLE IMMEDIATE .) (23 @ 14:33) >  Impression:  Left lung opacification is felt to be artifactual in nature.  However, a true frontal x-ray is recommended for further evaluation      < from: CT Angio Brain Stroke Protocol  w/ IV Cont (23 @ 14:01) >  IMPRESSION:  CT PERFUSION:  Tmax greater than 6 seconds 3 mL in the left cerebellar hemisphere may   represent ischemic penumbra, follow-up MRI of the brain is suggested if   not contraindicated in this patient.    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      < from: CT Brain Stroke Protocol (23 @ 13:34) >  IMPRESSION:  1.  Study limited by motion artifact.  2.  No acute intracranial hemorrhage or acute large territorial infarct.  3.  Cerebral atrophy.  4.  Periventricular white matter hypodensities, nonspecific differential diagnostic possibilities include ischemic change, gliosis or demyelination.  5.  Prominent sulcus in the left parietal region may represent focal atrophy or chronic infarct.      ASSESSMENT & PLAN:     #Persistent hypoglycemia, suspected exogenous insulin overdose  - S/p 5 amps D50  - Endocrine consult  - F/u C-peptide, serum insulin levels  - FSGs q1h  - Currently on D10 1/2 NS at 100cc/hr  - D50 pushes and titrate D10 as needed    # Syncope in setting of hypoglycemia   # IDDM  # Hypothyroidism   # HTN  # Advanced Dementia   - Patient clinically and hemodynamically stable on admission   - Baseline AOx2, seeking placement from family's end   - PT consult,  consult   - Workup negative for acute stroke   - EEG per neurology   - C/w home insulin 30 at bedtime + SS  - A1C, TSH, Lipid Profile in AM   - Fall precautions   - C/w home amlodipine 10, Levothyroxine 112, Lisinopril 40, and atorvastatin 80 QD    # DVT prophylaxis - Lovenox 40 QD   # GI prophylaxis  - Not Indicated   # Diet - DASH/TLC/CC  # Activity Score (AM-PAC) - IAT  # Code status - Full   # Disposition     For Follow-Up:  Upgraded for Hypoglycemia  - Endocrine consult  - F/u C-peptide, serum insulin levels  - FSGs q1h  - Currently on D10 1/2 NS at 100cc/hr  - D50 pushes and titrate D10 as needed Transfer Note    Transfer from: Med tele  Transfer to: ICU    HPI: 62y Female with PMHx of Advanced Dementia, DM, hypothyroidism, HTN who presented to the ED due to unresponsiveness in the setting of hypoglycemia, stroke code called in the ED due to confusion even with improvement of blood sugar. The patient's  saw her normal at 5am this morning when he checked her blood sugar which was 80 gave her insulin & levothyroxine and when he came back at 1130am she was unresponsive and foaming from the mouth. Checked her blood sugar which was in the 30s at this time so he called 911. When EMS arrived patient was diaphoretic and cold to the touch, FS was 47 at this time, therefore, dextrose and orange juice were given. Patient became more awake, however, confused and agitated.  states that she has had issues with hypoglycemia in the past, however, this is the most unresponsive she has been secondary to hypoglycemia. No history of seizures.   On arrival to the ED, patient moving all extremities antigravity with right gaze preference which spontaneously overcome. Patient required 2mg of Versed to tolerate CT scan.      In the ER:  Vitals: /80, HR 75, RR 14, T 36.8 oral, SpO2 98% RA  EKG: Sinus Rythm with 1st degree AV block   Sig Labs: Hgb 9.9, Cr 0.5, Trop -ve   Imagin) CXR Left lung opacification is felt to be artifactual in nature. However, a true frontal x-ray is recommended for further evaluation  2) CT Head + Angio: No acute infarct     COURSE:  Since admission pt's blood glucose levels consistently low despite multiple amps of D50 and D5 infusion.    Per admission med rec, pt on Novolog at home. Possible accidental exogenous insulin overdose at home prior to presentation.    Placed Endocrine consult  Discontinued Lantus order (pt did not receive any insulin doses while inpatient)  Multiple amps D50 given  [place] serum insulin and c-peptide while FSG was 59 prior to administering subsequent D50 dose.  Started pt on D10 infusion.  Increased D10 infusion to 125cc/hr    Pt upgraded to ICU for q1h FSG checks.      VITAL SIGNS: Last 24 Hours  T(C): 36.6 (16 May 2023 23:59), Max: 36.8 (16 May 2023 13:03)  T(F): 97.8 (16 May 2023 23:59), Max: 98.3 (16 May 2023 13:03)  HR: 60 (16 May 2023 23:59) (60 - 76)  BP: 159/65 (16 May 2023 23:59) (118/59 - 183/80)  BP(mean): 93 (16 May 2023 23:59) (93 - 93)  RR: 18 (16 May 2023 23:59) (14 - 19)  SpO2: 96% (16 May 2023 23:59) (96% - 100%)    MEDICATIONS:  STANDING MEDICATIONS  amLODIPine   Tablet 10 milliGRAM(s) Oral daily  atorvastatin 80 milliGRAM(s) Oral at bedtime  dextrose 10% + sodium chloride 0.45%. 1000 milliLiter(s) IV Continuous <Continuous>  dextrose 5%. 1000 milliLiter(s) IV Continuous <Continuous>  dextrose 5%. 1000 milliLiter(s) IV Continuous <Continuous>  dextrose 50% Injectable 12.5 Gram(s) IV Push once  dextrose 50% Injectable 25 Gram(s) IV Push once  dextrose 50% Injectable 25 Gram(s) IV Push once  enoxaparin Injectable 40 milliGRAM(s) SubCutaneous every 24 hours  glucagon  Injectable 1 milliGRAM(s) IntraMuscular once  levothyroxine 112 MICROGram(s) Oral daily  lisinopril 40 milliGRAM(s) Oral daily    PRN MEDICATIONS  dextrose Oral Gel 15 Gram(s) Oral once PRN    LABS:                        9.9    8.28  )-----------( 244      ( 16 May 2023 14:15 )             31.1     05-16    137  |  105  |  20  ----------------------------<  148<H>  5.6<H>   |  22  |  0.5<L>    Ca    9.0      16 May 2023 14:15    TPro  6.2  /  Alb  3.3<L>  /  TBili  <0.2  /  DBili  x   /  AST  73<H>  /  ALT  66<H>  /  AlkPhos  105  05-16    PT/INR - ( 16 May 2023 14:15 )   PT: 10.70 sec;   INR: 0.94 ratio    PTT - ( 16 May 2023 14:15 )  PTT:35.4 sec    CARDIAC MARKERS ( 16 May 2023 14:15 )  x     / <0.01 ng/mL / 197 U/L / x     / x          RADIOLOGY:  < from: Xray Chest 1 View-PORTABLE IMMEDIATE (Xray Chest 1 View-PORTABLE IMMEDIATE .) (23 @ 14:33) >  Impression:  Left lung opacification is felt to be artifactual in nature.  However, a true frontal x-ray is recommended for further evaluation      < from: CT Angio Brain Stroke Protocol  w/ IV Cont (23 @ 14:01) >  IMPRESSION:  CT PERFUSION:  Tmax greater than 6 seconds 3 mL in the left cerebellar hemisphere may   represent ischemic penumbra, follow-up MRI of the brain is suggested if   not contraindicated in this patient.    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      < from: CT Brain Stroke Protocol (23 @ 13:34) >  IMPRESSION:  1.  Study limited by motion artifact.  2.  No acute intracranial hemorrhage or acute large territorial infarct.  3.  Cerebral atrophy.  4.  Periventricular white matter hypodensities, nonspecific differential diagnostic possibilities include ischemic change, gliosis or demyelination.  5.  Prominent sulcus in the left parietal region may represent focal atrophy or chronic infarct.      ASSESSMENT & PLAN:     #Persistent hypoglycemia, suspected exogenous insulin overdose  - S/p 6 amps D50  - Endocrine consult  - F/u C-peptide, serum insulin levels  - FSGs q1h  - Currently on D10 1/2 NS at 100cc/hr  - D50 pushes and titrate D10 as needed    # Syncope in setting of hypoglycemia   # IDDM  # Hypothyroidism   # HTN  # Advanced Dementia   - Patient clinically and hemodynamically stable on admission   - Baseline AOx2, seeking placement from family's end   - PT consult,  consult   - Workup negative for acute stroke   - EEG per neurology   - C/w home insulin 30 at bedtime + SS  - A1C, TSH, Lipid Profile in AM   - Fall precautions   - C/w home amlodipine 10, Levothyroxine 112, Lisinopril 40, and atorvastatin 80 QD    # DVT prophylaxis - Lovenox 40 QD   # GI prophylaxis  - Not Indicated   # Diet - DASH/TLC/CC  # Activity Score (AM-PAC) - IAT  # Code status - Full   # Disposition     For Follow-Up:  Upgraded for Hypoglycemia  - Endocrine consult  - F/u C-peptide, serum insulin levels  - FSGs q1h  - Currently on D10 1/2 NS at 125cc/hr  - D50 pushes and titrate D10 as needed Transfer Note    Transfer from: Med tele  Transfer to: ICU    HPI: 62y Female with PMHx of Advanced Dementia, DM, hypothyroidism, HTN who presented to the ED due to unresponsiveness in the setting of hypoglycemia, stroke code called in the ED due to confusion even with improvement of blood sugar. The patient's  saw her normal at 5am this morning when he checked her blood sugar which was 80 gave her insulin & levothyroxine and when he came back at 1130am she was unresponsive and foaming from the mouth. Checked her blood sugar which was in the 30s at this time so he called 911. When EMS arrived patient was diaphoretic and cold to the touch, FS was 47 at this time, therefore, dextrose and orange juice were given. Patient became more awake, however, confused and agitated.  states that she has had issues with hypoglycemia in the past, however, this is the most unresponsive she has been secondary to hypoglycemia. No history of seizures.   On arrival to the ED, patient moving all extremities antigravity with right gaze preference which spontaneously overcome. Patient required 2mg of Versed to tolerate CT scan.      In the ER:  Vitals: /80, HR 75, RR 14, T 36.8 oral, SpO2 98% RA  EKG: Sinus Rythm with 1st degree AV block   Sig Labs: Hgb 9.9, Cr 0.5, Trop -ve   Imagin) CXR Left lung opacification is felt to be artifactual in nature. However, a true frontal x-ray is recommended for further evaluation  2) CT Head + Angio: No acute infarct     COURSE:  Since admission pt's blood glucose levels consistently low despite multiple amps of D50 and D5 infusion.    Per admission med rec, pt on Novolog at home. If cause is exogenous insulin, then not likely due to Novolog given its 2-4 hours of action.    Placed Endocrine consult  Discontinued Lantus order (pt did not receive any insulin doses while inpatient)  Multiple amps D50 given  [place] serum insulin and c-peptide while FSG was 59 prior to administering subsequent D50 dose.  Started pt on D10 infusion.  Increased D10 infusion to 125cc/hr    Pt upgraded to ICU for q1h FSG checks.      VITAL SIGNS: Last 24 Hours  T(C): 36.6 (16 May 2023 23:59), Max: 36.8 (16 May 2023 13:03)  T(F): 97.8 (16 May 2023 23:59), Max: 98.3 (16 May 2023 13:03)  HR: 60 (16 May 2023 23:59) (60 - 76)  BP: 159/65 (16 May 2023 23:59) (118/59 - 183/80)  BP(mean): 93 (16 May 2023 23:59) (93 - 93)  RR: 18 (16 May 2023 23:59) (14 - 19)  SpO2: 96% (16 May 2023 23:59) (96% - 100%)    MEDICATIONS:  STANDING MEDICATIONS  amLODIPine   Tablet 10 milliGRAM(s) Oral daily  atorvastatin 80 milliGRAM(s) Oral at bedtime  dextrose 10% + sodium chloride 0.45%. 1000 milliLiter(s) IV Continuous <Continuous>  dextrose 5%. 1000 milliLiter(s) IV Continuous <Continuous>  dextrose 5%. 1000 milliLiter(s) IV Continuous <Continuous>  dextrose 50% Injectable 12.5 Gram(s) IV Push once  dextrose 50% Injectable 25 Gram(s) IV Push once  dextrose 50% Injectable 25 Gram(s) IV Push once  enoxaparin Injectable 40 milliGRAM(s) SubCutaneous every 24 hours  glucagon  Injectable 1 milliGRAM(s) IntraMuscular once  levothyroxine 112 MICROGram(s) Oral daily  lisinopril 40 milliGRAM(s) Oral daily    PRN MEDICATIONS  dextrose Oral Gel 15 Gram(s) Oral once PRN    LABS:                        9.9    8.28  )-----------( 244      ( 16 May 2023 14:15 )             31.1         137  |  105  |  20  ----------------------------<  148<H>  5.6<H>   |  22  |  0.5<L>    Ca    9.0      16 May 2023 14:15    TPro  6.2  /  Alb  3.3<L>  /  TBili  <0.2  /  DBili  x   /  AST  73<H>  /  ALT  66<H>  /  AlkPhos  105  05-16    PT/INR - ( 16 May 2023 14:15 )   PT: 10.70 sec;   INR: 0.94 ratio    PTT - ( 16 May 2023 14:15 )  PTT:35.4 sec    CARDIAC MARKERS ( 16 May 2023 14:15 )  x     / <0.01 ng/mL / 197 U/L / x     / x          RADIOLOGY:  < from: Xray Chest 1 View-PORTABLE IMMEDIATE (Xray Chest 1 View-PORTABLE IMMEDIATE .) (23 @ 14:33) >  Impression:  Left lung opacification is felt to be artifactual in nature.  However, a true frontal x-ray is recommended for further evaluation      < from: CT Angio Brain Stroke Protocol  w/ IV Cont (23 @ 14:01) >  IMPRESSION:  CT PERFUSION:  Tmax greater than 6 seconds 3 mL in the left cerebellar hemisphere may   represent ischemic penumbra, follow-up MRI of the brain is suggested if   not contraindicated in this patient.    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      < from: CT Brain Stroke Protocol (23 @ 13:34) >  IMPRESSION:  1.  Study limited by motion artifact.  2.  No acute intracranial hemorrhage or acute large territorial infarct.  3.  Cerebral atrophy.  4.  Periventricular white matter hypodensities, nonspecific differential diagnostic possibilities include ischemic change, gliosis or demyelination.  5.  Prominent sulcus in the left parietal region may represent focal atrophy or chronic infarct.      ASSESSMENT & PLAN:     #Persistent hypoglycemia  - S/p 6 amps D50  - Endocrine consult  - F/u C-peptide, serum insulin levels  - FSGs q1h  - Currently on D10 1/2 NS at 100cc/hr  - D50 pushes and titrate D10 as needed    # Syncope in setting of hypoglycemia   # IDDM  # Hypothyroidism   # HTN  # Advanced Dementia   - Patient clinically and hemodynamically stable on admission   - Baseline AOx2, seeking placement from family's end   - PT consult,  consult   - Workup negative for acute stroke   - EEG per neurology   - C/w home insulin 30 at bedtime + SS  - A1C, TSH, Lipid Profile in AM   - Fall precautions   - C/w home amlodipine 10, Levothyroxine 112, Lisinopril 40, and atorvastatin 80 QD    # DVT prophylaxis - Lovenox 40 QD   # GI prophylaxis  - Not Indicated   # Diet - DASH/TLC/CC  # Activity Score (AM-PAC) - IAT  # Code status - Full   # Disposition     For Follow-Up:  Upgraded for Hypoglycemia  - Endocrine consult  - F/u C-peptide, serum insulin levels  - FSGs q1h  - Currently on D10 1/2 NS at 125cc/hr  - D50 pushes and titrate D10 as needed

## 2023-05-17 NOTE — CONSULT NOTE ADULT - SUBJECTIVE AND OBJECTIVE BOX
Patient is a 62y old  Female who presents with a chief complaint of AMS (17 May 2023 05:54)    HPI: 62y Female with PMHx of Advanced Dementia, DM, hypothyroidism, HTN who presented to the ED due to unresponsiveness in the setting of hypoglycemia, stroke code called in the ED due to confusion even with improvement of blood sugar. The patient's  saw her normal at 5am this morning when he checked her blood sugar which was 80 gave her insulin & levothyroxine and when he came back at 1130am she was unresponsive and foaming from the mouth. Checked her blood sugar which was in the 30s at this time so he called 911. When EMS arrived patient was diaphoretic and cold to the touch, FS was 47 at this time, therefore, dextrose and orange juice were given. Patient became more awake, however, confused and agitated.  states that she has had issues with hypoglycemia in the past, however, this is the most unresponsive she has been secondary to hypoglycemia. No history of seizures.   On arrival to the ED, patient moving all extremities antigravity with right gaze preference which spontaneously overcome. Patient required 2mg of Versed to tolerate CT scan. In ED, VSS. EKG: Sinus Rythm with 1st degree AV block. Imaging:  CXR Left lung opacification is felt to be artifactual in nature. However, a true frontal x-ray is recommended for further evaluation;  CT Head + Angio: No acute infarct.  Pt continued to have hypoglycemic episodes after admission and was upgraded to ICU for FS monitoring, remains on D10.  On medication review, pt takes Basaglar 30 U. History cannot be obtained from pt due to lethargy, altered mentation.    FAMILY HISTORY:    PAST MEDICAL & SURGICAL HISTORY:  Asymptomatic hypertension  Borderline hypercholesterolemia  Adult hypothyroidism  Atypical diabetes mellitus    Review of Systems: unreliable    Allergies  No Known Allergies    MEDICATIONS  (STANDING):  amLODIPine   Tablet 10 milliGRAM(s) Oral daily  atorvastatin 80 milliGRAM(s) Oral at bedtime  dextrose 10% + sodium chloride 0.45%. 1000 milliLiter(s) (125 mL/Hr) IV Continuous <Continuous>  dextrose 50% Injectable 25 Gram(s) IV Push once  enoxaparin Injectable 40 milliGRAM(s) SubCutaneous every 24 hours  levothyroxine 112 MICROGram(s) Oral daily  lisinopril 40 milliGRAM(s) Oral daily    MEDICATIONS  (PRN):      Vital Signs Last 24 Hrs  T(C): 36.2 (17 May 2023 09:18), Max: 36.8 (16 May 2023 13:03)  T(F): 97.1 (17 May 2023 09:18), Max: 98.3 (16 May 2023 13:03)  HR: 67 (17 May 2023 09:18) (60 - 76)  BP: 163/79 (17 May 2023 09:18) (112/59 - 183/80)  BP(mean): 114 (17 May 2023 09:18) (93 - 114)  RR: 24 (17 May 2023 09:18) (14 - 24)  SpO2: 98% (17 May 2023 09:18) (96% - 100%)    Parameters below as of 17 May 2023 09:18  Patient On (Oxygen Delivery Method): room air      Height (cm): 160 (05-17 @ 09:18)  Weight (kg): 66.8 (05-17 @ 09:18)  BMI (kg/m2): 26.1 (05-17 @ 09:18)    PHYSICAL EXAM  All physical exam findings normal, except those marked:  General:	Lethargic, arousable, does not answer most questions  Cardiovascular	Borderline nerissa, regular  Respiratory	Normal: no chest wall deformity, normal respiratory pattern, CTA B/L  Abdominal	Normal: soft, ND, NT, bowel sounds present, no masses, no organomegaly  Extremities	Normal: FROM x4  Skin		Appears dry    LABS  VBG - ( 16 May 2023 13:16 )  pH: 7.41  /  pCO2: 38    /  pO2: 76    / HCO3: 24    / Base Excess: -0.4  /  SvO2: 97.3  / Lactate: 0.80                           9.9    8.28  )-----------( 244      ( 16 May 2023 14:15 )             31.1     05-16    137  |  105  |  20  ----------------------------<  148<H>  5.6<H>   |  22  |  0.5<L>    Ca    9.0      16 May 2023 14:15    TPro  6.2  /  Alb  3.3<L>  /  TBili  <0.2  /  DBili  x   /  AST  73<H>  /  ALT  66<H>  /  AlkPhos  105  05-16      Ketone - Urine: Negative (05-17 @ 04:25)    CAPILLARY BLOOD GLUCOSE  192 (16 May 2023 14:31)      POCT Blood Glucose.: 203 mg/dL (17 May 2023 09:56)  POCT Blood Glucose.: 64 mg/dL (17 May 2023 09:21)  POCT Blood Glucose.: 107 mg/dL (17 May 2023 08:58)  POCT Blood Glucose.: 158 mg/dL (17 May 2023 08:03)  POCT Blood Glucose.: 152 mg/dL (17 May 2023 07:23)  POCT Blood Glucose.: 67 mg/dL (17 May 2023 06:51)  POCT Blood Glucose.: 90 mg/dL (17 May 2023 05:47)  POCT Blood Glucose.: 45 mg/dL (17 May 2023 04:52)  POCT Blood Glucose.: 96 mg/dL (17 May 2023 03:43)  POCT Blood Glucose.: 68 mg/dL (17 May 2023 02:40)  POCT Blood Glucose.: 97 mg/dL (17 May 2023 01:49)  POCT Blood Glucose.: 44 mg/dL (17 May 2023 00:56)  POCT Blood Glucose.: 49 mg/dL (17 May 2023 00:53)  POCT Blood Glucose.: 63 mg/dL (16 May 2023 23:43)  POCT Blood Glucose.: 122 mg/dL (16 May 2023 22:37)  POCT Blood Glucose.: 59 mg/dL (16 May 2023 21:51)  POCT Blood Glucose.: 132 mg/dL (16 May 2023 20:50)  POCT Blood Glucose.: 246 mg/dL (16 May 2023 20:03)  POCT Blood Glucose.: 22 mg/dL (16 May 2023 19:41)  POCT Blood Glucose.: 173 mg/dL (16 May 2023 16:51)  POCT Blood Glucose.: 31 mg/dL (16 May 2023 16:43)  POCT Blood Glucose.: 25 mg/dL (16 May 2023 16:41)  POCT Blood Glucose.: 152 mg/dL (16 May 2023 13:48)  POCT Blood Glucose.: 192 mg/dL (16 May 2023 13:07)

## 2023-05-17 NOTE — ED ADULT NURSE REASSESSMENT NOTE - NSFALLHARMRISKINTERV_ED_ALL_ED
Assistance OOB with selected safe patient handling equipment if applicable/Assistance with ambulation/Communicate risk of Fall with Harm to all staff, patient, and family/Monitor gait and stability/Monitor for mental status changes and reorient to person, place, and time, as needed/Provide visual cue: red socks, yellow wristband, yellow gown, etc/Reinforce activity limits and safety measures with patient and family/Toileting schedule using arm’s reach rule for commode and bathroom/Use of alarms - bed, stretcher, chair and/or video monitoring/Bed in lowest position, wheels locked, appropriate side rails in place/Call bell, personal items and telephone in reach/Instruct patient to call for assistance before getting out of bed/chair/stretcher/Non-slip footwear applied when patient is off stretcher/[place] to call system/Physically safe environment - no spills, clutter or unnecessary equipment/Purposeful Proactive Rounding/Room/bathroom lighting operational, light cord in reach

## 2023-05-17 NOTE — CHART NOTE - NSCHARTNOTEFT_GEN_A_CORE
Since admission pt's blood glucose levels consistently low despite multiple amps of D50 and D5 infusion.    Per admission med rec, pt on Novolog at home. Possible accidental exogenous insulin overdose at home prior to presentation.    Placed Endocrine consult  Discontinued Lantus order (pt did not receive any insulin doses while inpatient)  Multiple amps D50 given  [place] serum insulin and c-peptide while FSG was 59 prior to administering subsequent D50 dose.  Started pt on D10 infusion. Since admission pt's blood glucose levels consistently low despite multiple amps of D50 and D5 infusion.    Per admission med rec, pt on Novolog at home. Possible accidental exogenous insulin overdose at home prior to presentation.    Placed Endocrine consult  Discontinued Lantus order (pt did not receive any insulin doses while inpatient)  Multiple amps D50 given  [place] serum insulin and c-peptide while FSG was 59 prior to administering subsequent D50 dose.  Started pt on D10 infusion.  Increased D10 infusion to 100cc/hr    Discussed with Critical Care fellow on call, pt approved for upgrade to ICU care for q1h FSG checks. Since admission pt's blood glucose levels consistently low despite multiple amps of D50 and D5 infusion.    Per admission med rec, pt on Novolog at home. If cause is exogenous insulin, then not likely due to Novolog given its 2-4 hours of action.    Placed Endocrine consult  Discontinued Lantus order (pt did not receive any insulin doses while inpatient)  Multiple amps D50 given  [place] serum insulin and c-peptide while FSG was 59 prior to administering subsequent D50 dose.  Started pt on D10 infusion.  Increased D10 infusion to 100cc/hr    Discussed with Critical Care fellow on call, pt approved for upgrade to ICU care for q1h FSG checks. Since admission pt's blood glucose levels consistently low despite multiple amps of D50 and D5 infusion.    Per admission med rec, pt on Novolog at home. If cause is exogenous insulin, then not likely due to Novolog given its 2-4 hours of action.    Placed Endocrine consult  Discontinued Lantus order (pt did not receive any insulin doses while inpatient)  Multiple amps D50 given  [place] serum insulin and c-peptide while FSG was 59 prior to administering subsequent D50 dose.  Started pt on D10 infusion.  Increased D10 infusion to 125cc/hr    Discussed with Critical Care fellow on call, pt approved for upgrade to ICU care for q1h FSG checks.

## 2023-05-17 NOTE — PATIENT PROFILE ADULT - NSPROPOAPRESSUREINJURY_GEN_A_NUR
Called and spoke with the patient regarding skin biopsy results per Dr. Rey. 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 Explained that this mole did have some abnormal features, and because of this, it is classified as a dysplastic mole, atypical mole or Slim's nevus.  No further treatment is needed at this time.  If the mole grows back or a pigment stain appears within the scar, patient should contact the clinic so the area can be re-examined.     Also, explained that individuals who have these moles may be at somewhat increased risk of developing melanoma compared to the average person.  Therefore, patient should monitor skin for changes in existing moles or the appearance of new moles, and contact the clinic if there are any concerns.    Advised the patient to continue with biopsy site wound care until site was healed, monthly self-skin exams, and to use sun protection- SPF 30 broad spectrum sunscreen, protective clothing, avoiding sunburns and tanning beds.    Patient should also call the clinic if there are:  --Any new or changing spots.  --Any spots that are bleeding, crusting, non-healing or painful.  --Moles that are new, or that have changed in size, shape, border or color.     Patient stated they understood and was very appreciative of the follow up.   no

## 2023-05-17 NOTE — PHYSICAL THERAPY INITIAL EVALUATION ADULT - DIAGNOSIS, PT EVAL
Schedule an appointment and then we can refill  Shruthi Prajapati MD  8/23/2017    Hypoglycemia, gait dysfunction

## 2023-05-17 NOTE — CONSULT NOTE ADULT - ATTENDING COMMENTS
62y Female with PMHx of Advanced Dementia, DM, hypothyroidism, HTN who presented to the ED due to unresponsiveness in the setting of hypoglycemia, stroke code called in the ED due to confusion even with improvement of blood sugar. PAtient is unable to provide a history,  was unreachqable. Plan discussed with DR. Angie mckenna, patient is on basaglar 30 units at home and was advised to take novolog but never started it. As per  sunil mckenna patient was not started on orals as her c- peptide was low 0.9     #Hypoglycemia   - in a patient with Diabetes MEllitus on Insulin, most likely due to accidental insulin overdose  - Home regimen is basaglar 30 units, novolog advised but does not take it    LAst a1c 9.5 in Nov 2022, recommend to obtain a repeat level  - 62y Female with PMHx of Advanced Dementia, DM, hypothyroidism, HTN who presented to the ED due to unresponsiveness in the setting of hypoglycemia, stroke code called in the ED due to confusion even with improvement of blood sugar. PAtient is unable to provide a history,  was unreachqable. Plan discussed with DR. Angie mckenna, patient is on basaglar 30 units at home and was advised to take novolog but never started it. As per  sunil mckenna patient was not started on orals as her c- peptide was low 0.9     #Hypoglycemia   - in a patient with Diabetes MEllitus on Insulin, most likely due to accidental insulin overdose  - Home regimen is basaglar 30 units, novolog advised but does not take it    LAst a1c 9.5 in Nov 2022, recommend to obtain a repeat level  -blood glucose readings trended to 600 on d10, can stop the same, if blood glucose readings remain elevated above 200 off the drip can start with lower dose  lantus 10 units and lispro 1 unit for every 50 mg/dl above 150 mg/dl , tirate as per response  - DC recs to be determined

## 2023-05-17 NOTE — ED ADULT NURSE REASSESSMENT NOTE - NS ED NURSE REASSESS COMMENT FT1
Pt. received from previous RN. Pt. lying on stretcher, breathing with ease on RA. Pt. A&O x4. Pt. on CCM. 18g noted to the R hand; IV intact, no redness/swelling at site. 20g noted to the L FA; IV intact, no redness/swelling at site. D10 1/2NS running at 125mL/hr via Alaris pump. Pt. requires Q1 FS. Awaiting clean bed assignment. Pt. received from previous RN. Pt. lying on stretcher, breathing with ease on RA. Pt. A&O x4. Pt. on CCM. 18g noted to the R hand; IV intact, no redness/swelling at site. 20g noted to the L FA; IV intact, no redness/swelling at site. D10 1/2NS running at 125mL/hr via Alaris pump. Primafit noted. Pt. requires Q1 FS. Awaiting clean bed assignment.

## 2023-05-17 NOTE — CONSULT NOTE ADULT - ASSESSMENT
IMPRESSION:    Altered MS/ TME  severe hypoglycemia ( iatrogenic)  Dementia  HTN    PLAN:    CNS: Neuro fup, ASA/statin, EEG per neuro    HEENT:  Oral care    PULMONARY:  HOB @ 45 degrees, aspiration precaution, on RA    CARDIOVASCULAR: Keep D 10    GI: GI prophylaxis                                          Feeding ENCOURAGE PO feeding    RENAL:  F/u  lytes.  Correct as needed. accurate I/O    INFECTIOUS DISEASE: procal    HEMATOLOGICAL:  DVT prophylaxis.    ENDOCRINE:  Follow up FS.  Insulin protocol if needed. fs Q 1 H    MICU  Possible downgrade in PM

## 2023-05-17 NOTE — ED ADULT NURSE NOTE - OBJECTIVE STATEMENT
Pt is 62 yr female biba c/o hypoglycemia. Pts BS was at 80 at 5am,  then proceeded to give her insulin and her blood sugar had dropped. Pt was confused and disoriented.

## 2023-05-17 NOTE — CONSULT NOTE ADULT - ASSESSMENT
62y Female with PMHx of Advanced Dementia, DM, hypothyroidism, HTN who presented to the ED due to unresponsiveness in the setting of hypoglycemia, upgraded to ICU for persistent hypoglycemia.    *this is an incomplete note, final recs to follow* 62y Female with PMHx of Advanced Dementia, DM, hypothyroidism, HTN who presented to the ED due to unresponsiveness in the setting of hypoglycemia, upgraded to ICU for persistent hypoglycemia.    #Hx of Insulin Dependent DM  #Hypoglycemia  - Spoke to Dr. Ramos, patient's endocrinologist, pt has hx of poorly controlled diabetes and poor compliance  - Last A1c 9.5% in November  - Is ONLY on Basaglar 30 U once daily, was previously prescribed Novolog 6 U premeals but never took it   - Cannot reach  for further history, but per H&P, received Basaglar prior to hypoglycemic event despite a FS of 80 and may have taken or been given higher dose than her usual  - Recurrent hypoglycemia likely due to prolonged effect of Basaglar  - FS now elevated, hold off on further dextrose drip and monitor FS  - Can start low dose Lantus 10 U with sliding scale 1:50 for FS > 150 if FS persistently elevated off of dextrose

## 2023-05-17 NOTE — PHYSICAL THERAPY INITIAL EVALUATION ADULT - PATIENT PROFILE REVIEW, REHAB EVAL
March 3, 2021       Chio Rizo MD  [address] [facility] [address]  Via In Basket      Patient: Mauricio Arnold   YOB: 1967   Date of Visit: 3/3/2021       Dear Dr. Rizo:    Thank you for referring Mauricio Arnold to me for evaluation. Below are my notes for this visit with him.    If you have questions, please do not hesitate to call me. I look forward to following your patient along with you.      Sincerely,        Cayetano To MD        CC: No Recipients  Cayetano To MD  3/3/2021  1:26 PM  Signed    Subjective   Patient ID: Mauricio is a 53 year old male.    Chief Complaint   Patient presents with   • Office Visit     Cardiology   • Follow-up     3 months   • Chest Pain   • Shortness of Breath         HPI:   ====  Cardiac wise doing well  No angina    Cough with wheezing  He had history of COPD/bronchitis  He is coughing up greenish-yellowish sputum  Not much of shortness of breath no PND no orthopnea no ankle edema  Patient is not a smoker    Past Medical History:   Diagnosis Date   • CAD (coronary artery disease)    • Essential (primary) hypertension    • High cholesterol        ALLERGIES:  No Known Allergies     Past Surgical History:   Procedure Laterality Date   • Coronary stent placement         Family History   Problem Relation Age of Onset   • Pacemaker Mother    • Cancer Father        Social History     Tobacco Use   • Smoking status: Former Smoker     Packs/day: 0.00   • Smokeless tobacco: Never Used   Substance Use Topics   • Alcohol use: Never     Frequency: Never   • Drug use: Never        Recent hospitalization:  ====================   None     Review of Systems:   ================    Constitutional:  No chills, and no malaise  Eyes:  No change in eyesight, no pain  Ears, nose, mouth, throat, and face:  No pain, no swelling   Respiratory:  No hemoptysis  Cardiovascular:  No palpitation  Gastrointestinal:  No melena  Genitourinary:  No 
yes
hematuria  Musculoskeletal:  No muscle pain  Neurological:  No change in speech  Behavioral/Psych:  No change in mood  Skin : no itching   =============================================================================    Objective  =========  Vitals:    03/03/21 1301   BP: 136/78   BP Location: LUE - Left upper extremity   Patient Position: Sitting   Cuff Size: Regular   Pulse: 99   Temp: 98.1 °F (36.7 °C)   TempSrc: Temporal   SpO2: 99%   Weight: 91 kg (200 lb 9.9 oz)   Height: 6' (1.829 m)   PainSc:  0        Physical Exam:  =============  General: Alert, cooperative, no distress.  Head: No obvious abnormality  Eyes: Normal, no jaundice.  Throat: Lips, mucosa, moist and normal.  Neck: Supple.  Back: Symmetric.  Lungs: Decreased air entry bilaterally with minimal wheezing in the base but minimal rhonchi  Heart: Regular rate and rhythm, S1, S2 normal.  Abdomen: Soft, non-tender.  Extremities: No edema.  Skin: Normal texture  Neurologic: No clear deficit.    =============================================================================    Data  =====  Stress echocardiogram was done today reviewed    Normal LV systolic function    Patient did not exercise up to 85% of target heart rate no evidence of ischemia the heart rate achieved              ==============================================================================     Assessment :  ============    Coronary artery disease involving native coronary artery of native heart without angina pectoris  (primary encounter diagnosis)    Plan:  =====    We will treat acute bronchitis  Sensitivity specificity of the stress echo was explained to patient  No need for any cardiac work-up for her now  Continue same medication  Low-cholesterol diet Was  discussed with patient in details, low overall calorie, no red meat, no eggs, 2% milk, no butter, use corn oil or  olive oil..    Low-salt diet was emphasized    Exercise : Walking was recommended as preferable form of 
exercise    Compliance with medication  Monitor for any side effect          Orders Placed This Encounter   • CBC No Differential   • Comprehensive Metabolic Panel   • Lipid Panel With Reflex   • predniSONE (DELTASONE) 20 MG tablet   • albuterol 108 (90 Base) MCG/ACT inhaler   • CODEINE-GUAIFENESIN 200-10 MG/5ML Liquid   • azithromycin (ZITHROMAX) 250 MG tablet            Current Outpatient Medications   Medication Sig Dispense Refill   • metoPROLOL tartrate (LOPRESSOR) 25 MG tablet Take 25 mg by mouth.     • lisinopril (ZESTRIL) 20 MG tablet Take 1 tablet by mouth daily. 30 tablet 11   • pantoprazole (PROTONIX) 20 MG tablet Take 1 tablet by mouth daily. 30 tablet 3   • atorvastatin (LIPITOR) 80 MG tablet Take 1 tablet by mouth daily. 30 tablet 6   • clopidogrel (PLAVIX) 75 MG tablet Take 1 tablet by mouth daily. 30 tablet 6   • tamsulosin (FLOMAX) 0.4 MG Cap Take 1 capsule by mouth daily after a meal. 30 capsule 3   • potassium chloride (Klor-Con 10) 10 MEQ ER tablet Take 1 tablet by mouth daily. 90 tablet 3   • predniSONE (DELTASONE) 20 MG tablet Take 1 tablet by mouth 2 times daily. 60 tablet 0   • albuterol 108 (90 Base) MCG/ACT inhaler Inhale 2 puffs into the lungs every 4 hours as needed for Shortness of Breath or Wheezing. 1 Inhaler 11   • CODEINE-GUAIFENESIN 200-10 MG/5ML Liquid Take 15 mLs by mouth 4 times daily as needed (COUGH). 200 mL 0   • azithromycin (ZITHROMAX) 250 MG tablet Take 1 tablet by mouth daily for 5 days. 5 tablet 0   • tadalafil (CIALIS) 20 MG tablet Take 1 tablet by mouth as needed for Erectile Dysfunction. 10 tablet 4     No current facility-administered medications for this visit.           Electronically signed by:  Cayetano To MD, 3/3/2021              
yes

## 2023-05-18 LAB
ALBUMIN SERPL ELPH-MCNC: 3.5 G/DL — SIGNIFICANT CHANGE UP (ref 3.5–5.2)
ALP SERPL-CCNC: 110 U/L — SIGNIFICANT CHANGE UP (ref 30–115)
ALT FLD-CCNC: 45 U/L — HIGH (ref 0–41)
ANION GAP SERPL CALC-SCNC: 8 MMOL/L — SIGNIFICANT CHANGE UP (ref 7–14)
AST SERPL-CCNC: 29 U/L — SIGNIFICANT CHANGE UP (ref 0–41)
BILIRUB SERPL-MCNC: 0.4 MG/DL — SIGNIFICANT CHANGE UP (ref 0.2–1.2)
BUN SERPL-MCNC: 7 MG/DL — LOW (ref 10–20)
CALCIUM SERPL-MCNC: 9.8 MG/DL — SIGNIFICANT CHANGE UP (ref 8.4–10.4)
CHLORIDE SERPL-SCNC: 108 MMOL/L — SIGNIFICANT CHANGE UP (ref 98–110)
CO2 SERPL-SCNC: 27 MMOL/L — SIGNIFICANT CHANGE UP (ref 17–32)
CREAT SERPL-MCNC: 0.5 MG/DL — LOW (ref 0.7–1.5)
EGFR: 105 ML/MIN/1.73M2 — SIGNIFICANT CHANGE UP
GLUCOSE BLDC GLUCOMTR-MCNC: 103 MG/DL — HIGH (ref 70–99)
GLUCOSE BLDC GLUCOMTR-MCNC: 119 MG/DL — HIGH (ref 70–99)
GLUCOSE BLDC GLUCOMTR-MCNC: 139 MG/DL — HIGH (ref 70–99)
GLUCOSE BLDC GLUCOMTR-MCNC: 193 MG/DL — HIGH (ref 70–99)
GLUCOSE BLDC GLUCOMTR-MCNC: 78 MG/DL — SIGNIFICANT CHANGE UP (ref 70–99)
GLUCOSE SERPL-MCNC: 133 MG/DL — HIGH (ref 70–99)
HCT VFR BLD CALC: 35.5 % — LOW (ref 37–47)
HGB BLD-MCNC: 10.8 G/DL — LOW (ref 12–16)
MAGNESIUM SERPL-MCNC: 1.9 MG/DL — SIGNIFICANT CHANGE UP (ref 1.8–2.4)
MCHC RBC-ENTMCNC: 26 PG — LOW (ref 27–31)
MCHC RBC-ENTMCNC: 30.4 G/DL — LOW (ref 32–37)
MCV RBC AUTO: 85.5 FL — SIGNIFICANT CHANGE UP (ref 81–99)
NRBC # BLD: 0 /100 WBCS — SIGNIFICANT CHANGE UP (ref 0–0)
PLATELET # BLD AUTO: 275 K/UL — SIGNIFICANT CHANGE UP (ref 130–400)
PMV BLD: 10.3 FL — SIGNIFICANT CHANGE UP (ref 7.4–10.4)
POTASSIUM SERPL-MCNC: 4.1 MMOL/L — SIGNIFICANT CHANGE UP (ref 3.5–5)
POTASSIUM SERPL-SCNC: 4.1 MMOL/L — SIGNIFICANT CHANGE UP (ref 3.5–5)
PROT SERPL-MCNC: 6.5 G/DL — SIGNIFICANT CHANGE UP (ref 6–8)
RBC # BLD: 4.15 M/UL — LOW (ref 4.2–5.4)
RBC # FLD: 14.6 % — HIGH (ref 11.5–14.5)
SODIUM SERPL-SCNC: 143 MMOL/L — SIGNIFICANT CHANGE UP (ref 135–146)
WBC # BLD: 8.31 K/UL — SIGNIFICANT CHANGE UP (ref 4.8–10.8)
WBC # FLD AUTO: 8.31 K/UL — SIGNIFICANT CHANGE UP (ref 4.8–10.8)

## 2023-05-18 PROCEDURE — 99233 SBSQ HOSP IP/OBS HIGH 50: CPT

## 2023-05-18 RX ORDER — ATORVASTATIN CALCIUM 80 MG/1
40 TABLET, FILM COATED ORAL AT BEDTIME
Refills: 0 | Status: DISCONTINUED | OUTPATIENT
Start: 2023-05-18 | End: 2023-05-18

## 2023-05-18 RX ORDER — DEXTROSE 50 % IN WATER 50 %
15 SYRINGE (ML) INTRAVENOUS ONCE
Refills: 0 | Status: DISCONTINUED | OUTPATIENT
Start: 2023-05-18 | End: 2023-05-25

## 2023-05-18 RX ORDER — DEXTROSE 50 % IN WATER 50 %
25 SYRINGE (ML) INTRAVENOUS ONCE
Refills: 0 | Status: DISCONTINUED | OUTPATIENT
Start: 2023-05-18 | End: 2023-05-25

## 2023-05-18 RX ORDER — SODIUM CHLORIDE 9 MG/ML
1000 INJECTION, SOLUTION INTRAVENOUS
Refills: 0 | Status: DISCONTINUED | OUTPATIENT
Start: 2023-05-18 | End: 2023-05-25

## 2023-05-18 RX ORDER — ASPIRIN/CALCIUM CARB/MAGNESIUM 324 MG
81 TABLET ORAL DAILY
Refills: 0 | Status: DISCONTINUED | OUTPATIENT
Start: 2023-05-18 | End: 2023-05-25

## 2023-05-18 RX ORDER — INSULIN LISPRO 100/ML
VIAL (ML) SUBCUTANEOUS
Refills: 0 | Status: DISCONTINUED | OUTPATIENT
Start: 2023-05-18 | End: 2023-05-25

## 2023-05-18 RX ORDER — QUETIAPINE FUMARATE 200 MG/1
25 TABLET, FILM COATED ORAL ONCE
Refills: 0 | Status: COMPLETED | OUTPATIENT
Start: 2023-05-18 | End: 2023-05-18

## 2023-05-18 RX ORDER — DEXTROSE 50 % IN WATER 50 %
12.5 SYRINGE (ML) INTRAVENOUS ONCE
Refills: 0 | Status: DISCONTINUED | OUTPATIENT
Start: 2023-05-18 | End: 2023-05-25

## 2023-05-18 RX ORDER — CHLORHEXIDINE GLUCONATE 213 G/1000ML
1 SOLUTION TOPICAL
Refills: 0 | Status: DISCONTINUED | OUTPATIENT
Start: 2023-05-18 | End: 2023-05-25

## 2023-05-18 RX ORDER — GLUCAGON INJECTION, SOLUTION 0.5 MG/.1ML
1 INJECTION, SOLUTION SUBCUTANEOUS ONCE
Refills: 0 | Status: DISCONTINUED | OUTPATIENT
Start: 2023-05-18 | End: 2023-05-25

## 2023-05-18 RX ADMIN — ENOXAPARIN SODIUM 40 MILLIGRAM(S): 100 INJECTION SUBCUTANEOUS at 21:01

## 2023-05-18 RX ADMIN — ATORVASTATIN CALCIUM 80 MILLIGRAM(S): 80 TABLET, FILM COATED ORAL at 21:00

## 2023-05-18 RX ADMIN — QUETIAPINE FUMARATE 25 MILLIGRAM(S): 200 TABLET, FILM COATED ORAL at 21:00

## 2023-05-18 RX ADMIN — CHLORHEXIDINE GLUCONATE 1 APPLICATION(S): 213 SOLUTION TOPICAL at 06:33

## 2023-05-18 RX ADMIN — LISINOPRIL 40 MILLIGRAM(S): 2.5 TABLET ORAL at 06:19

## 2023-05-18 RX ADMIN — Medication 1: at 17:50

## 2023-05-18 RX ADMIN — AMLODIPINE BESYLATE 10 MILLIGRAM(S): 2.5 TABLET ORAL at 06:20

## 2023-05-18 RX ADMIN — Medication 81 MILLIGRAM(S): at 11:10

## 2023-05-18 RX ADMIN — Medication 112 MICROGRAM(S): at 06:19

## 2023-05-18 NOTE — PROGRESS NOTE ADULT - SUBJECTIVE AND OBJECTIVE BOX
Patient is a 62y old  Female who presents with a chief complaint of AMS (17 May 2023 10:14)        Over Night Events:    No further hypoglycemia   No fevers   Remains altered         ROS:  See HPI    PHYSICAL EXAM    ICU Vital Signs Last 24 Hrs  T(C): 36.8 (17 May 2023 20:00), Max: 36.8 (17 May 2023 20:00)  T(F): 98.3 (17 May 2023 20:00), Max: 98.3 (17 May 2023 20:00)  HR: 62 (18 May 2023 06:00) (60 - 83)  BP: 145/60 (18 May 2023 06:00) (116/59 - 187/70)  BP(mean): 86 (18 May 2023 06:00) (82 - 122)  ABP: --  ABP(mean): --  RR: 11 (18 May 2023 06:00) (11 - 45)  SpO2: 96% (18 May 2023 06:00) (94% - 100%)    O2 Parameters below as of 18 May 2023 06:00  Patient On (Oxygen Delivery Method): room air            General: NAD   HEENT: DEREK             Lymphatic system: No cervical LN   Lungs: Bilateral BS, clear  Cardiovascular: Regular   Gastrointestinal: Soft, Positive BS  Extremities: No clubbing.  Moves extremities.  Full Range of motion   Skin: Warm, intact  Neurological: No motor or sensory deficit; altered however         LABS:                            10.8   8.31  )-----------( 275      ( 18 May 2023 04:54 )             35.5                                               05-18    143  |  108  |  7<L>  ----------------------------<  133<H>  4.1   |  27  |  0.5<L>    Ca    9.8      18 May 2023 04:54  Mg     1.9     05-18    TPro  6.5  /  Alb  3.5  /  TBili  0.4  /  DBili  x   /  AST  29  /  ALT  45<H>  /  AlkPhos  110  05-18      PT/INR - ( 16 May 2023 14:15 )   PT: 10.70 sec;   INR: 0.94 ratio         PTT - ( 16 May 2023 14:15 )  PTT:35.4 sec                                       Urinalysis Basic - ( 17 May 2023 04:25 )    Color: Colorless / Appearance: Clear / S.016 / pH: x  Gluc: x / Ketone: Negative  / Bili: Negative / Urobili: <2 mg/dL   Blood: x / Protein: Negative / Nitrite: Negative   Leuk Esterase: Negative / RBC: x / WBC x   Sq Epi: x / Non Sq Epi: x / Bacteria: x        CARDIAC MARKERS ( 16 May 2023 14:15 )  x     / <0.01 ng/mL / 197 U/L / x     / x                                                LIVER FUNCTIONS - ( 18 May 2023 04:54 )  Alb: 3.5 g/dL / Pro: 6.5 g/dL / ALK PHOS: 110 U/L / ALT: 45 U/L / AST: 29 U/L / GGT: x                                                                                                                                       MEDICATIONS  (STANDING):  amLODIPine   Tablet 10 milliGRAM(s) Oral daily  atorvastatin 80 milliGRAM(s) Oral at bedtime  chlorhexidine 2% Cloths 1 Application(s) Topical <User Schedule>  enoxaparin Injectable 40 milliGRAM(s) SubCutaneous every 24 hours  levothyroxine 112 MICROGram(s) Oral daily  lisinopril 40 milliGRAM(s) Oral daily    MEDICATIONS  (PRN):      Xrays:                                                                                     ECHO

## 2023-05-18 NOTE — PROGRESS NOTE ADULT - ASSESSMENT
IMPRESSION:    Altered MS/ TME  Severe hypoglycemia ( iatrogenic)  Dementia  HTN    PLAN:    CNS: AMS. CTA with vertebral artery occlusion; CTH negative. EEG. Neurology follow up. ASA And statin.     HEENT:  Oral care    PULMONARY:  HOB @ 45 degrees, aspiration precaution, on RA    CARDIOVASCULAR: Keep equal balance.    GI: ORal diet as tolerated.     RENAL:  F/u  lytes.  Correct as needed. No need for sweet. Iv loakced now.     INFECTIOUS DISEASE: procalcitonin. Observe off abx.     HEMATOLOGICAL:  DVT prophylaxis.     ENDOCRINE:  Follow up FS.  Endocrinology follow up. Not on insulin. Basal insulin once FS improved/     Medical floor.

## 2023-05-19 ENCOUNTER — TRANSCRIPTION ENCOUNTER (OUTPATIENT)
Age: 63
End: 2023-05-19

## 2023-05-19 LAB
A1C WITH ESTIMATED AVERAGE GLUCOSE RESULT: 10.2 % — HIGH (ref 4–5.6)
ALBUMIN SERPL ELPH-MCNC: 3.1 G/DL — LOW (ref 3.5–5.2)
ALP SERPL-CCNC: 117 U/L — HIGH (ref 30–115)
ALT FLD-CCNC: 69 U/L — HIGH (ref 0–41)
ANION GAP SERPL CALC-SCNC: 7 MMOL/L — SIGNIFICANT CHANGE UP (ref 7–14)
AST SERPL-CCNC: 56 U/L — HIGH (ref 0–41)
BILIRUB SERPL-MCNC: 0.4 MG/DL — SIGNIFICANT CHANGE UP (ref 0.2–1.2)
BUN SERPL-MCNC: 16 MG/DL — SIGNIFICANT CHANGE UP (ref 10–20)
CALCIUM SERPL-MCNC: 8.8 MG/DL — SIGNIFICANT CHANGE UP (ref 8.4–10.5)
CHLORIDE SERPL-SCNC: 110 MMOL/L — SIGNIFICANT CHANGE UP (ref 98–110)
CO2 SERPL-SCNC: 27 MMOL/L — SIGNIFICANT CHANGE UP (ref 17–32)
CREAT SERPL-MCNC: 0.7 MG/DL — SIGNIFICANT CHANGE UP (ref 0.7–1.5)
EGFR: 97 ML/MIN/1.73M2 — SIGNIFICANT CHANGE UP
ESTIMATED AVERAGE GLUCOSE: 246 MG/DL — HIGH (ref 68–114)
GLUCOSE BLDC GLUCOMTR-MCNC: 117 MG/DL — HIGH (ref 70–99)
GLUCOSE BLDC GLUCOMTR-MCNC: 131 MG/DL — HIGH (ref 70–99)
GLUCOSE BLDC GLUCOMTR-MCNC: 195 MG/DL — HIGH (ref 70–99)
GLUCOSE BLDC GLUCOMTR-MCNC: 205 MG/DL — HIGH (ref 70–99)
GLUCOSE SERPL-MCNC: 135 MG/DL — HIGH (ref 70–99)
HCT VFR BLD CALC: 34.2 % — LOW (ref 37–47)
HGB BLD-MCNC: 10.5 G/DL — LOW (ref 12–16)
MAGNESIUM SERPL-MCNC: 2 MG/DL — SIGNIFICANT CHANGE UP (ref 1.8–2.4)
MCHC RBC-ENTMCNC: 26.4 PG — LOW (ref 27–31)
MCHC RBC-ENTMCNC: 30.7 G/DL — LOW (ref 32–37)
MCV RBC AUTO: 85.9 FL — SIGNIFICANT CHANGE UP (ref 81–99)
NRBC # BLD: 0 /100 WBCS — SIGNIFICANT CHANGE UP (ref 0–0)
PLATELET # BLD AUTO: 271 K/UL — SIGNIFICANT CHANGE UP (ref 130–400)
PMV BLD: 10.1 FL — SIGNIFICANT CHANGE UP (ref 7.4–10.4)
POTASSIUM SERPL-MCNC: 4.3 MMOL/L — SIGNIFICANT CHANGE UP (ref 3.5–5)
POTASSIUM SERPL-SCNC: 4.3 MMOL/L — SIGNIFICANT CHANGE UP (ref 3.5–5)
PROT SERPL-MCNC: 5.8 G/DL — LOW (ref 6–8)
RBC # BLD: 3.98 M/UL — LOW (ref 4.2–5.4)
RBC # FLD: 14.5 % — SIGNIFICANT CHANGE UP (ref 11.5–14.5)
SODIUM SERPL-SCNC: 144 MMOL/L — SIGNIFICANT CHANGE UP (ref 135–146)
WBC # BLD: 6.18 K/UL — SIGNIFICANT CHANGE UP (ref 4.8–10.8)
WBC # FLD AUTO: 6.18 K/UL — SIGNIFICANT CHANGE UP (ref 4.8–10.8)

## 2023-05-19 PROCEDURE — 71045 X-RAY EXAM CHEST 1 VIEW: CPT | Mod: 26

## 2023-05-19 PROCEDURE — 99233 SBSQ HOSP IP/OBS HIGH 50: CPT

## 2023-05-19 RX ORDER — METFORMIN HYDROCHLORIDE 850 MG/1
500 TABLET ORAL DAILY
Refills: 0 | Status: DISCONTINUED | OUTPATIENT
Start: 2023-05-19 | End: 2023-05-24

## 2023-05-19 RX ORDER — TAMSULOSIN HYDROCHLORIDE 0.4 MG/1
0.4 CAPSULE ORAL AT BEDTIME
Refills: 0 | Status: DISCONTINUED | OUTPATIENT
Start: 2023-05-19 | End: 2023-05-25

## 2023-05-19 RX ADMIN — CHLORHEXIDINE GLUCONATE 1 APPLICATION(S): 213 SOLUTION TOPICAL at 06:55

## 2023-05-19 RX ADMIN — Medication 1: at 16:42

## 2023-05-19 RX ADMIN — LISINOPRIL 40 MILLIGRAM(S): 2.5 TABLET ORAL at 05:29

## 2023-05-19 RX ADMIN — Medication 81 MILLIGRAM(S): at 12:01

## 2023-05-19 RX ADMIN — TAMSULOSIN HYDROCHLORIDE 0.4 MILLIGRAM(S): 0.4 CAPSULE ORAL at 21:50

## 2023-05-19 RX ADMIN — METFORMIN HYDROCHLORIDE 500 MILLIGRAM(S): 850 TABLET ORAL at 18:25

## 2023-05-19 RX ADMIN — ENOXAPARIN SODIUM 40 MILLIGRAM(S): 100 INJECTION SUBCUTANEOUS at 22:58

## 2023-05-19 RX ADMIN — Medication 112 MICROGRAM(S): at 05:29

## 2023-05-19 RX ADMIN — ATORVASTATIN CALCIUM 80 MILLIGRAM(S): 80 TABLET, FILM COATED ORAL at 21:50

## 2023-05-19 RX ADMIN — AMLODIPINE BESYLATE 10 MILLIGRAM(S): 2.5 TABLET ORAL at 05:30

## 2023-05-19 NOTE — DIETITIAN INITIAL EVALUATION ADULT - ADD RECOMMEND
1. Add Glucerna Shake 3X/DAILY to optimize kcal/pro intake -- provides 660 kcal/day total, 30g pro/day total  2. Continue with current diet order  3. Encourage PO intake and assist during meals  4. Adjust insulin prn -- monitor BG, POCT BG, A1C -- high    Pt is at high nutrition risk; will f/u in 3-5 days or prn.

## 2023-05-19 NOTE — DIETITIAN INITIAL EVALUATION ADULT - ORAL INTAKE PTA/DIET HISTORY
Pt unable to participate in nutrition assessment interview at this time; confused/disoriented per flow sheet. Hx limited to medical chart.  Pt unable to participate in nutrition assessment interview at this time; confused/disoriented per flow sheet. No family at bedside. Hx limited to medical chart.

## 2023-05-19 NOTE — CHART NOTE - NSCHARTNOTEFT_GEN_A_CORE
1L of urine output via straight cath prior to transfer to   Per RN bladder scan ~ 0600 showing 690 cc, straight cath ordered  F/u repeat bladder scan in 8 hours, place sweet if still retaining 1L of urine output via straight cath prior to transfer to   Per RN bladder scan ~ 0600 showing 690 cc, straight cath ordered > 900 cc output  F/u repeat bladder scan in 8 hours, place Mayen if still retaining

## 2023-05-19 NOTE — DIETITIAN INITIAL EVALUATION ADULT - NS FNS DIET ORDER
Diet, DASH/TLC:   Sodium & Cholesterol Restricted  Consistent Carbohydrate {Evening Snack}  Soft and Bite Sized ([id number]) (05-17-23 @ 04:43) [Active]

## 2023-05-19 NOTE — DIETITIAN INITIAL EVALUATION ADULT - PERTINENT MEDS FT
MEDICATIONS  (STANDING):  amLODIPine   Tablet 10 milliGRAM(s) Oral daily  aspirin  chewable 81 milliGRAM(s) Oral daily  atorvastatin 80 milliGRAM(s) Oral at bedtime  chlorhexidine 2% Cloths 1 Application(s) Topical <User Schedule>  dextrose 5%. 1000 milliLiter(s) (100 mL/Hr) IV Continuous <Continuous>  dextrose 5%. 1000 milliLiter(s) (50 mL/Hr) IV Continuous <Continuous>  dextrose 50% Injectable 25 Gram(s) IV Push once  dextrose 50% Injectable 25 Gram(s) IV Push once  dextrose 50% Injectable 12.5 Gram(s) IV Push once  enoxaparin Injectable 40 milliGRAM(s) SubCutaneous every 24 hours  glucagon  Injectable 1 milliGRAM(s) IntraMuscular once  insulin lispro (ADMELOG) corrective regimen sliding scale   SubCutaneous three times a day before meals  levothyroxine 112 MICROGram(s) Oral daily  lisinopril 40 milliGRAM(s) Oral daily  tamsulosin 0.4 milliGRAM(s) Oral at bedtime    MEDICATIONS  (PRN):  dextrose Oral Gel 15 Gram(s) Oral once PRN Blood Glucose LESS THAN 70 milliGRAM(s)/deciliter

## 2023-05-19 NOTE — DISCHARGE NOTE NURSING/CASE MANAGEMENT/SOCIAL WORK - PATIENT PORTAL LINK FT
You can access the FollowMyHealth Patient Portal offered by [facility] by registering at the following website: [email or web address]. By joining [facility]’s FollowMyHealth portal, you will also be able to view your health information using other applications (apps) compatible with our system.

## 2023-05-19 NOTE — DIETITIAN INITIAL EVALUATION ADULT - NAME AND PHONE
Maya [phone] or TEAMS     Nutrition Interventions: Meals, snacks, medical nutrition supplements; Nutrition Monitoring: Diet order, PO intake, weights, labs, NFPF, body composition, BM and tolerance to medical food supplements.

## 2023-05-19 NOTE — DIETITIAN INITIAL EVALUATION ADULT - OTHER CALCULATIONS
Using ABW: ENERGY: 8823-0623 kcal/day (MSJ 1191.45* 1.1-1.2 SF); PROTEIN: 67-80 g/day (1-1.2 g/kg); FLUID: 3613-1029 mL/day (25-30 mL/kg) -- with consideration for age, BMI

## 2023-05-19 NOTE — PROGRESS NOTE ADULT - ASSESSMENT
62y Female with PMHx of Advanced Dementia, DM, hypothyroidism, HTN who presented to the ED due to unresponsiveness in the setting of hypoglycemia, stroke code called in the ED due to confusion even with improvement of blood sugar. PAtient is unable to provide a history,  was unreachqable. Plan discussed with DR. Angie mckenna, patient is on basaglar 30 units at home and was advised to take novolog but never started it. As per sunil mckenna patient was not started on orals as her c- peptide was low-nl 0.9     # Syncope in setting of hypoglycemia   # IDDM  # Hypothyroidism   # HTN  # Advanced Dementia   - Baseline AOx2,  - PT consult,  consult   - Workup negative for acute stroke   - EEG negative  - will d/w endo outpt recs ?orals  - Fall precautions   - C/w home amlodipine 10, Levothyroxine 112, Lisinopril 40, and atorvastatin 80 QD  - Home regimen is basaglar 30 units, novolog advised but does not take it   - FS have been stable on just ISS  - needs  to administer Insulin    # DVT prophylaxis - Lovenox 40 QD   # GI prophylaxis  - Not Indicated   # Diet - DASH/TLC/CC  # Activity Score (AM-PAC) - IAT  # Code status - Full.    High risk pt.    #Progress Note Handoff  Pending: Clinical improvement and stability__x___PT____x____  Pt/Family discussion: Pt informed and agrees with the current plan  Disposition: Home______/SNF_______/4A______/To be determined____x____    My note supersedes the residents note should a discrepancy arise.    Chart and notes personally reviewed.  Care Discussed with Consultants/Other Providers/ Housestaff [ x] YES [ ] NO   Radiology, labs, old records personally reviewed.    discussed w/ housestaff, nursing, case management    Attestation Statements:    Attestation Statements:  Risk Statement (NON-critical care).     On this date of service, level of risk to patient is considered: High.     Due to: hypoglycemia    Time-based billing (NON-critical care).     50 minutes spent on total encounter. The necessity of the time spent during the encounter on this date of service was due to:     time spent on review of labs, imaging studies, old records, obtaining history, personally examining patient, counselling and communicating with patient/ family, entering orders for medications/tests/etc, discussions with other health care providers, documentation in electronic health records, independent interpretation of labs, imaging/procedure results and care coordination.

## 2023-05-19 NOTE — DIETITIAN INITIAL EVALUATION ADULT - PERTINENT LABORATORY DATA
05-19    144  |  110  |  16  ----------------------------<  135<H>  4.3   |  27  |  0.7    Ca    8.8      19 May 2023 06:19  Mg     2.0     05-19    TPro  5.8<L>  /  Alb  3.1<L>  /  TBili  0.4  /  DBili  x   /  AST  56<H>  /  ALT  69<H>  /  AlkPhos  117<H>  05-19  POCT Blood Glucose.: 131 mg/dL (05-19-23 @ 11:04)  A1C with Estimated Average Glucose Result: 10.2 % (05-19-23 @ 06:19)   05-19    144  |  110  |  16  ----------------------------<  135<H>  4.3   |  27  |  0.7    Ca    8.8      19 May 2023 06:19  Mg     2.0     05-19    TPro  5.8<L>  /  Alb  3.1<L>  /  TBili  0.4  /  DBili  x   /  AST  56<H>  /  ALT  69<H>  /  AlkPhos  117<H>  05-19  A1C with Estimated Average Glucose Result: 10.2 % (05-19-23 @ 06:19)    CAPILLARY BLOOD GLUCOSE: finger sticks  POCT Blood Glucose.: 131 mg/dL (19 May 2023 11:04)  POCT Blood Glucose.: 117 mg/dL (19 May 2023 08:07)  POCT Blood Glucose.: 78 mg/dL (18 May 2023 21:12)  POCT Blood Glucose.: 193 mg/dL (18 May 2023 17:42)

## 2023-05-19 NOTE — DIETITIAN INITIAL EVALUATION ADULT - OTHER INFO
Pertinent Medical Information: Per H&P, pt is a 63 y/o female with PMHx of Advanced Dementia, DM, hypothyroidism, HTN who presented to the ED due to unresponsiveness in the setting of hypoglycemia, stroke code called in the ED due to confusion even with improvement of blood sugar.     Pertinent Subjective Information: Pt requires assistance during meals. Per staff, pt slept through breakfast today. Staff will encourage PO intake and assist during lunch.     Weight hx: UBW unknown. Current dosing weight is 66.8 KG. Previous admission weight was  Pertinent Medical Information: Per H&P, pt is a 61 y/o female with PMHx of Advanced Dementia, DM, hypothyroidism, HTN who presented to the ED due to unresponsiveness in the setting of hypoglycemia, stroke code called in the ED due to confusion even with improvement of blood sugar.     Pertinent Subjective Information: Pt requires assistance during meals. Per staff, pt slept through breakfast today. Staff will encourage PO intake and assist during lunch.     Weight hx: UBW unknown at this time. Current dosing weight is 66.8 KG. No previous admission weights in EMR. Will attempt to obtain UBW at follow up to determine if pt meets weight criteria for malnutrition.

## 2023-05-19 NOTE — DISCHARGE NOTE NURSING/CASE MANAGEMENT/SOCIAL WORK - NSDCPEFALRISK_GEN_ALL_CORE
For information on Fall & Injury Prevention, visit: [email or web address] OR  [email or web address] OR  https://www.cdc.gov/steadi/patient.html

## 2023-05-19 NOTE — PROGRESS NOTE ADULT - SUBJECTIVE AND OBJECTIVE BOX
Patient is a 62y old  Female who presents with a chief complaint of Hypoglycemia     (19 May 2023 13:20)    INTERVAL HPI/OVERNIGHT EVENTS: Patient was examined and seen at bedside. This morning pt is resting comfortably in bed and reports no new issues or overnight events. No complaints, feels well.  ROS: Denies CP, SOB, AP, new weakness  All other systems reviewed and are within normal limits.  InitialHPI:  HPI: 62y Female with PMHx of Advanced Dementia, DM, hypothyroidism, HTN who presented to the ED due to unresponsiveness in the setting of hypoglycemia, stroke code called in the ED due to confusion even with improvement of blood sugar. The patient's  saw her normal at 5am this morning when he checked her blood sugar which was 80 gave her insulin & levothyroxine and when he came back at 1130am she was unresponsive and foaming from the mouth. Checked her blood sugar which was in the 30s at this time so he called 911. When EMS arrived patient was diaphoretic and cold to the touch, FS was 47 at this time, therefore, dextrose and orange juice were given. Patient became more awake, however, confused and agitated.  states that she has had issues with hypoglycemia in the past, however, this is the most unresponsive she has been secondary to hypoglycemia. No history of seizures.   On arrival to the ED, patient moving all extremities antigravity with right gaze preference which spontaneously overcome. Patient required 2mg of Versed to tolerate CT scan.      In the ER:  Vitals: /80, HR 75, RR 14, T 36.8 oral, SpO2 98% RA  EKG: Sinus Rythm with 1st degree AV block   Sig Labs: Hgb 9.9, Cr 0.5, Trop -ve   Imagin) CXR Left lung opacification is felt to be artifactual in nature. However, a true frontal x-ray is recommended for further evaluation  2) CT Head + Angio: No acute infarct  (16 May 2023 16:39)    PAST MEDICAL & SURGICAL HISTORY:  Asymptomatic hypertension      Borderline hypercholesterolemia      Adult hypothyroidism      Atypical diabetes mellitus          General: NAD, AAOx2  HEENT:  EOMI, no LAD  CV: S1 S2  Resp: decreased breath sounds at bases  GI: NT/ND/S +BS  MS: no clubbing/cyanosis/edema, + pulses b/l  Neuro: nonfocal, +reflexes thruout    MEDICATIONS  (STANDING):  amLODIPine   Tablet 10 milliGRAM(s) Oral daily  aspirin  chewable 81 milliGRAM(s) Oral daily  atorvastatin 80 milliGRAM(s) Oral at bedtime  chlorhexidine 2% Cloths 1 Application(s) Topical <User Schedule>  dextrose 5%. 1000 milliLiter(s) (100 mL/Hr) IV Continuous <Continuous>  dextrose 5%. 1000 milliLiter(s) (50 mL/Hr) IV Continuous <Continuous>  dextrose 50% Injectable 25 Gram(s) IV Push once  dextrose 50% Injectable 12.5 Gram(s) IV Push once  dextrose 50% Injectable 25 Gram(s) IV Push once  enoxaparin Injectable 40 milliGRAM(s) SubCutaneous every 24 hours  glucagon  Injectable 1 milliGRAM(s) IntraMuscular once  insulin lispro (ADMELOG) corrective regimen sliding scale   SubCutaneous three times a day before meals  levothyroxine 112 MICROGram(s) Oral daily  lisinopril 40 milliGRAM(s) Oral daily  metFORMIN 500 milliGRAM(s) Oral daily  tamsulosin 0.4 milliGRAM(s) Oral at bedtime    MEDICATIONS  (PRN):  dextrose Oral Gel 15 Gram(s) Oral once PRN Blood Glucose LESS THAN 70 milliGRAM(s)/deciliter    Vital Signs Last 24 Hrs  T(C): 36.5 (19 May 2023 13:43), Max: 36.6 (18 May 2023 20:00)  T(F): 97.7 (19 May 2023 13:43), Max: 97.9 (18 May 2023 20:00)  HR: 67 (19 May 2023 13:43) (61 - 75)  BP: 146/65 (19 May 2023 13:43) (120/67 - 163/70)  BP(mean): 94 (19 May 2023 13:43) (86 - 124)  RR: 18 (19 May 2023 13:43) (14 - 23)  SpO2: 98% (19 May 2023 13:43) (97% - 99%)    Parameters below as of 19 May 2023 13:43  Patient On (Oxygen Delivery Method): room air      CAPILLARY BLOOD GLUCOSE      POCT Blood Glucose.: 195 mg/dL (19 May 2023 16:33)  POCT Blood Glucose.: 131 mg/dL (19 May 2023 11:04)  POCT Blood Glucose.: 117 mg/dL (19 May 2023 08:07)  POCT Blood Glucose.: 78 mg/dL (18 May 2023 21:12)  POCT Blood Glucose.: 193 mg/dL (18 May 2023 17:42)                          10.5   6.18  )-----------( 271      ( 19 May 2023 06:19 )             34.2         144  |  110  |  16  ----------------------------<  135<H>  4.3   |  27  |  0.7    Ca    8.8      19 May 2023 06:19  Mg     2.0         TPro  5.8<L>  /  Alb  3.1<L>  /  TBili  0.4  /  DBili  x   /  AST  56<H>  /  ALT  69<H>  /  AlkPhos  117<H>      LIVER FUNCTIONS - ( 19 May 2023 06:19 )  Alb: 3.1 g/dL / Pro: 5.8 g/dL / ALK PHOS: 117 U/L / ALT: 69 U/L / AST: 56 U/L / GGT: x                           Culture - Urine (collected 17 May 2023 04:25)  Source: Clean Catch Clean Catch (Midstream)  Preliminary Report (19 May 2023 11:53):    10,000 - 49,000 CFU/mL Proteus mirabilis      Chart, Consultant(s) Notes Reviewed:  [x ] YES  [ ] NO  Care Discussed with Consultants/Other Providers/ Housestaff [ x] YES  [ ] NO  Radiology, labs, old available records personally reviewed.

## 2023-05-20 ENCOUNTER — TRANSCRIPTION ENCOUNTER (OUTPATIENT)
Age: 63
End: 2023-05-20

## 2023-05-20 LAB
-  AMIKACIN: SIGNIFICANT CHANGE UP
-  AMOXICILLIN/CLAVULANIC ACID: SIGNIFICANT CHANGE UP
-  AMPICILLIN/SULBACTAM: SIGNIFICANT CHANGE UP
-  AMPICILLIN: SIGNIFICANT CHANGE UP
-  AZTREONAM: SIGNIFICANT CHANGE UP
-  CEFAZOLIN: SIGNIFICANT CHANGE UP
-  CEFEPIME: SIGNIFICANT CHANGE UP
-  CEFOXITIN: SIGNIFICANT CHANGE UP
-  CEFTRIAXONE: SIGNIFICANT CHANGE UP
-  CEFUROXIME: SIGNIFICANT CHANGE UP
-  CIPROFLOXACIN: SIGNIFICANT CHANGE UP
-  ERTAPENEM: SIGNIFICANT CHANGE UP
-  GENTAMICIN: SIGNIFICANT CHANGE UP
-  LEVOFLOXACIN: SIGNIFICANT CHANGE UP
-  MEROPENEM: SIGNIFICANT CHANGE UP
-  NITROFURANTOIN: SIGNIFICANT CHANGE UP
-  PIPERACILLIN/TAZOBACTAM: SIGNIFICANT CHANGE UP
-  TOBRAMYCIN: SIGNIFICANT CHANGE UP
-  TRIMETHOPRIM/SULFAMETHOXAZOLE: SIGNIFICANT CHANGE UP
ANION GAP SERPL CALC-SCNC: 8 MMOL/L — SIGNIFICANT CHANGE UP (ref 7–14)
BUN SERPL-MCNC: 16 MG/DL — SIGNIFICANT CHANGE UP (ref 10–20)
CALCIUM SERPL-MCNC: 8.8 MG/DL — SIGNIFICANT CHANGE UP (ref 8.4–10.5)
CHLORIDE SERPL-SCNC: 106 MMOL/L — SIGNIFICANT CHANGE UP (ref 98–110)
CO2 SERPL-SCNC: 24 MMOL/L — SIGNIFICANT CHANGE UP (ref 17–32)
CREAT SERPL-MCNC: 0.6 MG/DL — LOW (ref 0.7–1.5)
CULTURE RESULTS: SIGNIFICANT CHANGE UP
EGFR: 100 ML/MIN/1.73M2 — SIGNIFICANT CHANGE UP
GLUCOSE BLDC GLUCOMTR-MCNC: 164 MG/DL — HIGH (ref 70–99)
GLUCOSE BLDC GLUCOMTR-MCNC: 221 MG/DL — HIGH (ref 70–99)
GLUCOSE BLDC GLUCOMTR-MCNC: 239 MG/DL — HIGH (ref 70–99)
GLUCOSE BLDC GLUCOMTR-MCNC: 260 MG/DL — HIGH (ref 70–99)
GLUCOSE SERPL-MCNC: 177 MG/DL — HIGH (ref 70–99)
HCT VFR BLD CALC: 33.3 % — LOW (ref 37–47)
HGB BLD-MCNC: 10.5 G/DL — LOW (ref 12–16)
MAGNESIUM SERPL-MCNC: 1.8 MG/DL — SIGNIFICANT CHANGE UP (ref 1.8–2.4)
MCHC RBC-ENTMCNC: 26.6 PG — LOW (ref 27–31)
MCHC RBC-ENTMCNC: 31.5 G/DL — LOW (ref 32–37)
MCV RBC AUTO: 84.3 FL — SIGNIFICANT CHANGE UP (ref 81–99)
METHOD TYPE: SIGNIFICANT CHANGE UP
NRBC # BLD: 0 /100 WBCS — SIGNIFICANT CHANGE UP (ref 0–0)
ORGANISM # SPEC MICROSCOPIC CNT: SIGNIFICANT CHANGE UP
ORGANISM # SPEC MICROSCOPIC CNT: SIGNIFICANT CHANGE UP
PLATELET # BLD AUTO: 249 K/UL — SIGNIFICANT CHANGE UP (ref 130–400)
PMV BLD: 10.5 FL — HIGH (ref 7.4–10.4)
POTASSIUM SERPL-MCNC: 4.3 MMOL/L — SIGNIFICANT CHANGE UP (ref 3.5–5)
POTASSIUM SERPL-SCNC: 4.3 MMOL/L — SIGNIFICANT CHANGE UP (ref 3.5–5)
RBC # BLD: 3.95 M/UL — LOW (ref 4.2–5.4)
RBC # FLD: 14.3 % — SIGNIFICANT CHANGE UP (ref 11.5–14.5)
SODIUM SERPL-SCNC: 138 MMOL/L — SIGNIFICANT CHANGE UP (ref 135–146)
SPECIMEN SOURCE: SIGNIFICANT CHANGE UP
WBC # BLD: 10.2 K/UL — SIGNIFICANT CHANGE UP (ref 4.8–10.8)
WBC # FLD AUTO: 10.2 K/UL — SIGNIFICANT CHANGE UP (ref 4.8–10.8)

## 2023-05-20 PROCEDURE — 99233 SBSQ HOSP IP/OBS HIGH 50: CPT

## 2023-05-20 RX ORDER — INSULIN LISPRO 100/ML
2 VIAL (ML) SUBCUTANEOUS ONCE
Refills: 0 | Status: COMPLETED | OUTPATIENT
Start: 2023-05-20 | End: 2023-05-20

## 2023-05-20 RX ORDER — NIFEDIPINE 30 MG
10 TABLET, EXTENDED RELEASE 24 HR ORAL ONCE
Refills: 0 | Status: DISCONTINUED | OUTPATIENT
Start: 2023-05-20 | End: 2023-05-25

## 2023-05-20 RX ADMIN — Medication 112 MICROGRAM(S): at 05:43

## 2023-05-20 RX ADMIN — TAMSULOSIN HYDROCHLORIDE 0.4 MILLIGRAM(S): 0.4 CAPSULE ORAL at 21:48

## 2023-05-20 RX ADMIN — ENOXAPARIN SODIUM 40 MILLIGRAM(S): 100 INJECTION SUBCUTANEOUS at 22:43

## 2023-05-20 RX ADMIN — CHLORHEXIDINE GLUCONATE 1 APPLICATION(S): 213 SOLUTION TOPICAL at 05:44

## 2023-05-20 RX ADMIN — Medication 1: at 17:30

## 2023-05-20 RX ADMIN — LISINOPRIL 40 MILLIGRAM(S): 2.5 TABLET ORAL at 05:43

## 2023-05-20 RX ADMIN — METFORMIN HYDROCHLORIDE 500 MILLIGRAM(S): 850 TABLET ORAL at 12:13

## 2023-05-20 RX ADMIN — Medication 3: at 11:54

## 2023-05-20 RX ADMIN — Medication 81 MILLIGRAM(S): at 12:12

## 2023-05-20 RX ADMIN — ATORVASTATIN CALCIUM 80 MILLIGRAM(S): 80 TABLET, FILM COATED ORAL at 21:48

## 2023-05-20 RX ADMIN — Medication 2 UNIT(S): at 21:47

## 2023-05-20 RX ADMIN — Medication 2: at 07:55

## 2023-05-20 RX ADMIN — AMLODIPINE BESYLATE 10 MILLIGRAM(S): 2.5 TABLET ORAL at 05:42

## 2023-05-20 NOTE — PROGRESS NOTE ADULT - ASSESSMENT
62y Female with PMHx of Advanced Dementia, DM, hypothyroidism, HTN who presented to the ED due to unresponsiveness in the setting of hypoglycemia, stroke code called in the ED due to confusion even with improvement of blood sugar. Plan discussed with DR. Angie mckenna, patient is on basaglar 30 units at home and was advised to take novolog but never started it. As per primary endo patient was not started on orals as her c- peptide was low-nl 0.9     # Syncope/?Seizure in setting of hypoglycemia   # IDDM  # Hypothyroidism   # HTN  # Dementia   - Baseline AOx2,  - PT consult,  consult   - Workup negative for acute stroke   - EEG negative  - will d/w endo outpt recs ?orals  - Fall precautions   - C/w home amlodipine 10, Levothyroxine 112, Lisinopril 40, and atorvastatin 80 QD  - Home regimen is basaglar 30 units, novolog advised but does not take it   - FS have been stable on just ISS  - needs  to administer Insulin  - check B12, RPR, ammonia, TSH  - as per , dementia is long standing and gradually progressing (needs help with all ADLs)  -  w/ poor medical literacy.  -  wants pt home even if she will need hospital bed (previously was able to ambulate) and declines SNF  - if FS cont to go up, will start on a low dose Lantus    # DVT prophylaxis - Lovenox 40 QD   # GI prophylaxis  - Not Indicated   # Diet - DASH/TLC/CC  # Activity Score (AM-PAC) - IAT  # Code status - Full.    High risk pt.    #Progress Note Handoff  Pending: Clinical improvement and stability__x___PT____x___?DMEs_  Pt/Family discussion:  informed and agrees with the current plan  Disposition: Home______    My note supersedes the residents note should a discrepancy arise.    Chart and notes personally reviewed.  Care Discussed with Consultants/Other Providers/ Housestaff [ x] YES [ ] NO   Radiology, labs, old records personally reviewed.    discussed w/ housestaff, nursing, case management    Attestation Statements:    Attestation Statements:  Risk Statement (NON-critical care).     On this date of service, level of risk to patient is considered: High.     Due to: hypoglycemia, progression of dementia    Time-based billing (NON-critical care).     50 minutes spent on total encounter. The necessity of the time spent during the encounter on this date of service was due to:     time spent on review of labs, imaging studies, old records, obtaining history, personally examining patient, counselling and communicating with patient/ family, entering orders for medications/tests/etc, discussions with other health care providers, documentation in electronic health records, independent interpretation of labs, imaging/procedure results and care coordination.

## 2023-05-20 NOTE — DISCHARGE NOTE PROVIDER - NSDCHHNEEDSERVICE_GEN_ALL_CORE
Observation and assessment/Ostomy care and management/Rehabilitation services Observation and assessment/Rehabilitation services/Teaching and training

## 2023-05-20 NOTE — DISCHARGE NOTE PROVIDER - HOSPITAL COURSE
HPI: 62y Female with PMHx of Advanced Dementia, DM, hypothyroidism, HTN who presented to the ED due to unresponsiveness in the setting of hypoglycemia, stroke code called in the ED due to confusion even with improvement of blood sugar. The patient's  saw her normal at 5am this morning when he checked her blood sugar which was 80 gave her insulin & levothyroxine and when he came back at 1130am she was unresponsive and foaming from the mouth. Checked her blood sugar which was in the 30s at this time so he called 911. When EMS arrived patient was diaphoretic and cold to the touch, FS was 47 at this time, therefore, dextrose and orange juice were given. Patient became more awake, however, confused and agitated.  states that she has had issues with hypoglycemia in the past, however, this is the most unresponsive she has been secondary to hypoglycemia. No history of seizures.   On arrival to the ED, patient moving all extremities antigravity with right gaze preference which spontaneously overcome. Patient required 2mg of Versed to tolerate CT scan.      In the ER:  Vitals: /80, HR 75, RR 14, T 36.8 oral, SpO2 98% RA  EKG: Sinus Rythm with 1st degree AV block   Sig Labs: Hgb 9.9, Cr 0.5, Trop -ve   Imagin) CXR Left lung opacification is felt to be artifactual in nature. However, a true frontal x-ray is recommended for further evaluation  2) CT Head + Angio: No acute infarct     Hospital Course:    Pt monitored in house over 5 days for AMS likely secondary to overdosing ovf insuliinn. Pt with improving fingersticks and mental status (poor at baseline) over hospital Admission. Pt was evaluated by endocrinology, recommendations received and uptitrated on insulin in house with precaution. Pt is stable for discharge with close outpt endocrine follow up and new anti-hyperglycemic medication regimen. HPI: 62y Female with PMHx of Advanced Dementia, DM, hypothyroidism, HTN who presented to the ED due to unresponsiveness in the setting of hypoglycemia, stroke code called in the ED due to confusion even with improvement of blood sugar. The patient's  saw her normal at 5am this morning when he checked her blood sugar which was 80 gave her insulin & levothyroxine and when he came back at 1130am she was unresponsive and foaming from the mouth. Checked her blood sugar which was in the 30s at this time so he called 911. When EMS arrived patient was diaphoretic and cold to the touch, FS was 47 at this time, therefore, dextrose and orange juice were given. Patient became more awake, however, confused and agitated.  states that she has had issues with hypoglycemia in the past, however, this is the most unresponsive she has been secondary to hypoglycemia. No history of seizures.   On arrival to the ED, patient moving all extremities antigravity with right gaze preference which spontaneously overcome. Patient required 2mg of Versed to tolerate CT scan.      In the ER:  Vitals: /80, HR 75, RR 14, T 36.8 oral, SpO2 98% RA  EKG: Sinus Rythm with 1st degree AV block   Sig Labs: Hgb 9.9, Cr 0.5, Trop -ve   Imagin) CXR Left lung opacification is felt to be artifactual in nature. However, a true frontal x-ray is recommended for further evaluation  2) CT Head + Angio: No acute infarct     Hospital Course:    Pt monitored in house over 5 days for AMS likely secondary to overdosing ovf insuliinn. Pt with improving fingersticks and mental status (poor at baseline) over hospital Admission. Pt was evaluated by endocrinology, recommendations received and uptitrated on insulin in house with precaution. Pt is stable for discharge with close outpt endocrine follow up and new anti-hyperglycemic medication regimen. Pt noted with elevating transaminitis, present on admission. WIll withhold high dose statin due to unclear necessity and ask for monitoring and repeat lab work prior to re-initiation with outpt physician. HPI: 62y Female with PMHx of Advanced Dementia, DM, hypothyroidism, HTN who presented to the ED due to unresponsiveness in the setting of hypoglycemia, stroke code called in the ED due to confusion even with improvement of blood sugar. The patient's  saw her normal at 5am this morning when he checked her blood sugar which was 80 gave her insulin & levothyroxine and when he came back at 1130am she was unresponsive and foaming from the mouth. Checked her blood sugar which was in the 30s at this time so he called 911. When EMS arrived patient was diaphoretic and cold to the touch, FS was 47 at this time, therefore, dextrose and orange juice were given. Patient became more awake, however, confused and agitated.  states that she has had issues with hypoglycemia in the past, however, this is the most unresponsive she has been secondary to hypoglycemia. No history of seizures.   On arrival to the ED, patient moving all extremities antigravity with right gaze preference which spontaneously overcome. Patient required 2mg of Versed to tolerate CT scan.      In the ER:  Vitals: /80, HR 75, RR 14, T 36.8 oral, SpO2 98% RA  EKG: Sinus Rythm with 1st degree AV block   Sig Labs: Hgb 9.9, Cr 0.5, Trop -ve   Imagin) CXR Left lung opacification is felt to be artifactual in nature. However, a true frontal x-ray is recommended for further evaluation  2) CT Head + Angio: No acute infarct     Hospital Course:    Pt monitored in house over 5 days for AMS likely secondary to overdosing ovf insuliinn. Pt with improving fingersticks and mental status (poor at baseline) over hospital Admission. Pt was evaluated by endocrinology, recommendations received and uptitrated on insulin in house with precaution. Pt is stable for discharge with close outpt endocrine follow up and new anti-hyperglycemic medication regimen. Pt noted with elevating transaminitis, present on admission and currently downtrending prior to discharge. RUQ US without acute process. Will withhold high dose statin due to unclear necessity and ask for monitoring and repeat lab work prior to re-initiation with outpt physician.

## 2023-05-20 NOTE — DISCHARGE NOTE PROVIDER - PROVIDER TOKENS
PROVIDER:[TOKEN:[[id number]],FOLLOWUP:[1 week]] PROVIDER:[TOKEN:[[id number]],FOLLOWUP:[1 week]],PROVIDER:[TOKEN:[[id number]],FOLLOWUP:[2 weeks]]

## 2023-05-20 NOTE — DISCHARGE NOTE PROVIDER - NSDCMRMEDTOKEN_GEN_ALL_CORE_FT
amLODIPine 10 mg oral tablet: 1 tab(s) orally once a day  atorvastatin 10 mg oral tablet: 1 tab(s) orally once a day  hydrALAZINE 10 mg oral tablet: orally once a day  NovoLOG 100 units/mL subcutaneous solution: unit(s) subcutaneous once a day  Synthroid 112 mcg (0.112 mg) oral tablet: 1 tab(s) orally once a day   amLODIPine 10 mg oral tablet: 1 tab(s) orally once a day  aspirin 81 mg oral tablet, chewable: 1 tab(s) orally once a day  atorvastatin 80 mg oral tablet: 1 tab(s) orally once a day (at bedtime)  insulin glargine 100 units/mL subcutaneous solution: 4 unit(s) subcutaneous once a day  insulin lispro 100 units/mL injectable solution: 1 unit(s) injectable 3 times a day (before meals) for fingerstick between 0 and 150: give no extra insulin  for fingerstick between 151- 200: give 1 unit pre-meal  for fingerstick between 201-250: give 2 units pre-meal  for fingerstick between 251-300: give 3 units pre-meal  for fingerstick between 301-350: give 4 units pre-meal  for fingerstick between 351-400: give 5 units pre-meal  for fingerstick between 401+: please call your doctor  lisinopril 40 mg oral tablet: 1 tab(s) orally once a day  metFORMIN 500 mg oral tablet: 1 tab(s) orally once a day  NIFEdipine 10 mg oral capsule: 1 cap(s) orally once  Synthroid 112 mcg (0.112 mg) oral tablet: 1 tab(s) orally once a day  tamsulosin 0.4 mg oral capsule: 1 cap(s) orally once a day (at bedtime)   amLODIPine 10 mg oral tablet: 1 tab(s) orally once a day  aspirin 81 mg oral tablet, chewable: 1 tab(s) orally once a day  insulin glargine 100 units/mL subcutaneous solution: 4 unit(s) subcutaneous once a day  insulin lispro 100 units/mL injectable solution: 1 unit(s) injectable 3 times a day (before meals) for fingerstick between 0 and 150: give no extra insulin  for fingerstick between 151- 200: give 1 unit pre-meal  for fingerstick between 201-250: give 2 units pre-meal  for fingerstick between 251-300: give 3 units pre-meal  for fingerstick between 301-350: give 4 units pre-meal  for fingerstick between 351-400: give 5 units pre-meal  for fingerstick between 401+: please call your doctor  lisinopril 40 mg oral tablet: 1 tab(s) orally once a day  metFORMIN 500 mg oral tablet: 1 tab(s) orally once a day  NIFEdipine 10 mg oral capsule: 1 cap(s) orally once  Synthroid 112 mcg (0.112 mg) oral tablet: 1 tab(s) orally once a day  tamsulosin 0.4 mg oral capsule: 1 cap(s) orally once a day (at bedtime)   amLODIPine 10 mg oral tablet: 1 tab(s) orally once a day  aspirin 81 mg oral tablet, chewable: 1 tab(s) orally once a day  insulin glargine 100 units/mL subcutaneous solution: 4 unit(s) subcutaneous once a day  insulin lispro 100 units/mL injectable solution: 1 unit(s) injectable 3 times a day (before meals) for fingerstick between 0 and 150: give no extra insulin  for fingerstick between 151- 200: give 1 unit pre-meal  for fingerstick between 201-250: give 2 units pre-meal  for fingerstick between 251-300: give 3 units pre-meal  for fingerstick between 301-350: give 4 units pre-meal  for fingerstick between 351-400: give 5 units pre-meal  for fingerstick between 401+: please call your doctor  lisinopril 40 mg oral tablet: 1 tab(s) orally once a day  Synthroid 112 mcg (0.112 mg) oral tablet: 1 tab(s) orally once a day  tamsulosin 0.4 mg oral capsule: 1 cap(s) orally once a day (at bedtime)

## 2023-05-20 NOTE — DISCHARGE NOTE PROVIDER - CARE PROVIDER_API CALL
Jackie Ramos  ENDOCRINOLOGY/METAB/DIABETES  [address] [place] [address]  Phone: [phone]  Fax: [phone]  Follow Up Time: 1 week   Jackie Ramos  ENDOCRINOLOGY/METAB/DIABETES  [address] [place] [address]  Phone: [phone]  Fax: [phone]  Follow Up Time: 1 week    Gerry Montes)  EEGEpilepsy; Neurology  [address] [place] [address], [address]  [place] [address]  Phone: [phone]  Fax: [phone]  Follow Up Time: 2 weeks

## 2023-05-20 NOTE — DISCHARGE NOTE PROVIDER - CARE PROVIDERS DIRECT ADDRESSES
,DirectAddress_Unknown ,DirectAddress_Unknown,[email or web address]

## 2023-05-20 NOTE — PROGRESS NOTE ADULT - SUBJECTIVE AND OBJECTIVE BOX
Patient is a 62y old  Female who presents with a chief complaint of Hypoglycemia     (19 May 2023 13:20)    INTERVAL HPI/OVERNIGHT EVENTS: Patient was examined and seen at bedside. This morning pt is resting comfortably in bed and reports no new issues or overnight events. No complaints. Wants to be left alone and sleep. Poor historian.  ROS: Denies CP, SOB, AP, new weakness  All other systems reviewed and are within normal limits.  InitialHPI:  HPI: 62y Female with PMHx of Advanced Dementia, DM, hypothyroidism, HTN who presented to the ED due to unresponsiveness in the setting of hypoglycemia, stroke code called in the ED due to confusion even with improvement of blood sugar. The patient's  saw her normal at 5am this morning when he checked her blood sugar which was 80 gave her insulin & levothyroxine and when he came back at 1130am she was unresponsive and foaming from the mouth. Checked her blood sugar which was in the 30s at this time so he called 911. When EMS arrived patient was diaphoretic and cold to the touch, FS was 47 at this time, therefore, dextrose and orange juice were given. Patient became more awake, however, confused and agitated.  states that she has had issues with hypoglycemia in the past, however, this is the most unresponsive she has been secondary to hypoglycemia. No history of seizures.   On arrival to the ED, patient moving all extremities antigravity with right gaze preference which spontaneously overcome. Patient required 2mg of Versed to tolerate CT scan.      In the ER:  Vitals: /80, HR 75, RR 14, T 36.8 oral, SpO2 98% RA  EKG: Sinus Rythm with 1st degree AV block   Sig Labs: Hgb 9.9, Cr 0.5, Trop -ve   Imagin) CXR Left lung opacification is felt to be artifactual in nature. However, a true frontal x-ray is recommended for further evaluation  2) CT Head + Angio: No acute infarct  (16 May 2023 16:39)    PAST MEDICAL & SURGICAL HISTORY:  Asymptomatic hypertension      Borderline hypercholesterolemia      Adult hypothyroidism      Atypical diabetes mellitus          General: NAD, AAOx2  HEENT:  EOMI, no LAD  CV: S1 S2  Resp: decreased breath sounds at bases  GI: NT/ND/S +BS  MS: no clubbing/cyanosis/edema, + pulses b/l  Neuro: nonfocal, +reflexes thruout          Home Medications:  amLODIPine 10 mg oral tablet: 1 tab(s) orally once a day (2019 08:59)  atorvastatin 10 mg oral tablet: 1 tab(s) orally once a day (2019 08:59)  hydrALAZINE 10 mg oral tablet: orally once a day (2019 08:59)  NovoLOG 100 units/mL subcutaneous solution: unit(s) subcutaneous once a day (2019 08:59)  Synthroid 112 mcg (0.112 mg) oral tablet: 1 tab(s) orally once a day (2019 08:59)    MEDICATIONS  (STANDING):  amLODIPine   Tablet 10 milliGRAM(s) Oral daily  aspirin  chewable 81 milliGRAM(s) Oral daily  atorvastatin 80 milliGRAM(s) Oral at bedtime  chlorhexidine 2% Cloths 1 Application(s) Topical <User Schedule>  dextrose 5%. 1000 milliLiter(s) (100 mL/Hr) IV Continuous <Continuous>  dextrose 5%. 1000 milliLiter(s) (50 mL/Hr) IV Continuous <Continuous>  dextrose 50% Injectable 25 Gram(s) IV Push once  dextrose 50% Injectable 25 Gram(s) IV Push once  dextrose 50% Injectable 12.5 Gram(s) IV Push once  enoxaparin Injectable 40 milliGRAM(s) SubCutaneous every 24 hours  glucagon  Injectable 1 milliGRAM(s) IntraMuscular once  insulin lispro (ADMELOG) corrective regimen sliding scale   SubCutaneous three times a day before meals  levothyroxine 112 MICROGram(s) Oral daily  lisinopril 40 milliGRAM(s) Oral daily  metFORMIN 500 milliGRAM(s) Oral daily  tamsulosin 0.4 milliGRAM(s) Oral at bedtime    MEDICATIONS  (PRN):  dextrose Oral Gel 15 Gram(s) Oral once PRN Blood Glucose LESS THAN 70 milliGRAM(s)/deciliter    Vital Signs Last 24 Hrs  T(C): 36.1 (20 May 2023 03:43), Max: 36.5 (19 May 2023 13:43)  T(F): 97 (20 May 2023 03:43), Max: 97.7 (19 May 2023 13:43)  HR: 68 (20 May 2023 03:43) (65 - 68)  BP: 147/66 (20 May 2023 03:43) (146/65 - 167/71)  BP(mean): 102 (19 May 2023 20:41) (94 - 102)  RR: 18 (20 May 2023 03:43) (18 - 18)  SpO2: 98% (19 May 2023 13:43) (98% - 98%)    Parameters below as of 19 May 2023 13:43  Patient On (Oxygen Delivery Method): room air      CAPILLARY BLOOD GLUCOSE      POCT Blood Glucose.: 260 mg/dL (20 May 2023 10:54)  POCT Blood Glucose.: 239 mg/dL (20 May 2023 07:23)  POCT Blood Glucose.: 205 mg/dL (19 May 2023 21:19)  POCT Blood Glucose.: 195 mg/dL (19 May 2023 16:33)    LABS:                        10.5   10.20 )-----------( 249      ( 20 May 2023 05:36 )             33.3     05-20    138  |  106  |  16  ----------------------------<  177<H>  4.3   |  24  |  0.6<L>    Ca    8.8      20 May 2023 05:36  Mg     1.8     05-20    TPro  5.8<L>  /  Alb  3.1<L>  /  TBili  0.4  /  DBili  x   /  AST  56<H>  /  ALT  69<H>  /  AlkPhos  117<H>  -    LIVER FUNCTIONS - ( 19 May 2023 06:19 )  Alb: 3.1 g/dL / Pro: 5.8 g/dL / ALK PHOS: 117 U/L / ALT: 69 U/L / AST: 56 U/L / GGT: x                           Consultant Notes Reviewed:  [x ] YES  [ ] NO  Care Discussed with Consultants/Other Providers/ Housestaff [ x] YES  [ ] NO  Radiology, labs, new studies personally reviewed.

## 2023-05-20 NOTE — DISCHARGE NOTE PROVIDER - NSDCCPCAREPLAN_GEN_ALL_CORE_FT
PRINCIPAL DISCHARGE DIAGNOSIS  Diagnosis: Hypoglycemia  Assessment and Plan of Treatment: -Please continue new regimen as tolerated  -Please follow up with your primary care provider shortly after discharge with a list of your fingersticks to help further adjust your insulin regimen      SECONDARY DISCHARGE DIAGNOSES  Diagnosis: Altered mental status  Assessment and Plan of Treatment:      PRINCIPAL DISCHARGE DIAGNOSIS  Diagnosis: Hypoglycemia  Assessment and Plan of Treatment: -Please continue new regimen as tolerated  -Please follow up with your primary care provider shortly after discharge with a list of your fingersticks to help further adjust your insulin regimen      SECONDARY DISCHARGE DIAGNOSES  Diagnosis: Altered mental status  Assessment and Plan of Treatment:     Diagnosis: Urinary retention  Assessment and Plan of Treatment: -Please monitor urine output at home.  -If you notice you are not urinating at your normal frequency or notice pelvic fullness, please come to the emergency department or an urgent care     PRINCIPAL DISCHARGE DIAGNOSIS  Diagnosis: Hypoglycemia  Assessment and Plan of Treatment: -Please continue new regimen as tolerated  -Please follow up with your primary care provider shortly after discharge with a list of your fingersticks to help further adjust your insulin regimen      SECONDARY DISCHARGE DIAGNOSES  Diagnosis: Altered mental status  Assessment and Plan of Treatment:     Diagnosis: Urinary retention  Assessment and Plan of Treatment: -Please monitor urine output at home.  -If you notice you are not urinating at your normal frequency or notice pelvic fullness, please come to the emergency department or an urgent care    Diagnosis: Transaminitis  Assessment and Plan of Treatment: -please withhold your atorvastatin  -please repeat a comprehensive metabolic panel with your primary care provider prior to restarting atorvastatin     PRINCIPAL DISCHARGE DIAGNOSIS  Diagnosis: Hypoglycemia  Assessment and Plan of Treatment: -Please continue new regimen as tolerated  -Please follow up with your primary care provider shortly after discharge with a list of your fingersticks to help further adjust your insulin regimen  -Please use sliding scale short acting insulin as follows. Please check fingersticks prior to each meal and give:  1 unit for fingerstick between 150-200  2 units for fingerstick between 201-250  3 units for fingerstick between 251-300  4 units for fingersticks between 301-350  5 units for fingerstick between 351-400  5 units for fingerstick between 400+ and please call your primary care physician if fingersticks are above 400        SECONDARY DISCHARGE DIAGNOSES  Diagnosis: Altered mental status  Assessment and Plan of Treatment:     Diagnosis: Urinary retention  Assessment and Plan of Treatment: -Please monitor urine output at home.  -If you notice you are not urinating at your normal frequency or notice pelvic fullness, please come to the emergency department or an urgent care    Diagnosis: Transaminitis  Assessment and Plan of Treatment: -please withhold your atorvastatin  -please repeat a comprehensive metabolic panel with your primary care provider prior to restarting atorvastatin

## 2023-05-20 NOTE — DISCHARGE NOTE PROVIDER - DISCHARGE DIET
Consistent Carbohydrate Diabetic Diets DASH Diet/Low Sodium Diet/Consistent Carbohydrate Diabetic Diets

## 2023-05-21 LAB
AMMONIA BLD-MCNC: 42 UMOL/L — SIGNIFICANT CHANGE UP (ref 11–55)
ANION GAP SERPL CALC-SCNC: 7 MMOL/L — SIGNIFICANT CHANGE UP (ref 7–14)
BUN SERPL-MCNC: 17 MG/DL — SIGNIFICANT CHANGE UP (ref 10–20)
CALCIUM SERPL-MCNC: 8.8 MG/DL — SIGNIFICANT CHANGE UP (ref 8.4–10.5)
CHLORIDE SERPL-SCNC: 105 MMOL/L — SIGNIFICANT CHANGE UP (ref 98–110)
CHOLEST SERPL-MCNC: 200 MG/DL — HIGH
CO2 SERPL-SCNC: 24 MMOL/L — SIGNIFICANT CHANGE UP (ref 17–32)
CREAT SERPL-MCNC: 0.6 MG/DL — LOW (ref 0.7–1.5)
EGFR: 100 ML/MIN/1.73M2 — SIGNIFICANT CHANGE UP
GLUCOSE BLDC GLUCOMTR-MCNC: 152 MG/DL — HIGH (ref 70–99)
GLUCOSE BLDC GLUCOMTR-MCNC: 182 MG/DL — HIGH (ref 70–99)
GLUCOSE BLDC GLUCOMTR-MCNC: 186 MG/DL — HIGH (ref 70–99)
GLUCOSE BLDC GLUCOMTR-MCNC: 269 MG/DL — HIGH (ref 70–99)
GLUCOSE SERPL-MCNC: 170 MG/DL — HIGH (ref 70–99)
HCT VFR BLD CALC: 34 % — LOW (ref 37–47)
HDLC SERPL-MCNC: 77 MG/DL — SIGNIFICANT CHANGE UP
HGB BLD-MCNC: 10.5 G/DL — LOW (ref 12–16)
LIPID PNL WITH DIRECT LDL SERPL: 113 MG/DL — HIGH
MAGNESIUM SERPL-MCNC: 1.8 MG/DL — SIGNIFICANT CHANGE UP (ref 1.8–2.4)
MCHC RBC-ENTMCNC: 26.3 PG — LOW (ref 27–31)
MCHC RBC-ENTMCNC: 30.9 G/DL — LOW (ref 32–37)
MCV RBC AUTO: 85.2 FL — SIGNIFICANT CHANGE UP (ref 81–99)
NON HDL CHOLESTEROL: 123 MG/DL — SIGNIFICANT CHANGE UP
NRBC # BLD: 0 /100 WBCS — SIGNIFICANT CHANGE UP (ref 0–0)
PLATELET # BLD AUTO: 263 K/UL — SIGNIFICANT CHANGE UP (ref 130–400)
PMV BLD: 10.2 FL — SIGNIFICANT CHANGE UP (ref 7.4–10.4)
POTASSIUM SERPL-MCNC: 4.6 MMOL/L — SIGNIFICANT CHANGE UP (ref 3.5–5)
POTASSIUM SERPL-SCNC: 4.6 MMOL/L — SIGNIFICANT CHANGE UP (ref 3.5–5)
RBC # BLD: 3.99 M/UL — LOW (ref 4.2–5.4)
RBC # FLD: 14.3 % — SIGNIFICANT CHANGE UP (ref 11.5–14.5)
SODIUM SERPL-SCNC: 136 MMOL/L — SIGNIFICANT CHANGE UP (ref 135–146)
TRIGL SERPL-MCNC: 45 MG/DL — SIGNIFICANT CHANGE UP
WBC # BLD: 8.11 K/UL — SIGNIFICANT CHANGE UP (ref 4.8–10.8)
WBC # FLD AUTO: 8.11 K/UL — SIGNIFICANT CHANGE UP (ref 4.8–10.8)

## 2023-05-21 PROCEDURE — 99232 SBSQ HOSP IP/OBS MODERATE 35: CPT

## 2023-05-21 RX ADMIN — ENOXAPARIN SODIUM 40 MILLIGRAM(S): 100 INJECTION SUBCUTANEOUS at 23:34

## 2023-05-21 RX ADMIN — METFORMIN HYDROCHLORIDE 500 MILLIGRAM(S): 850 TABLET ORAL at 11:13

## 2023-05-21 RX ADMIN — ATORVASTATIN CALCIUM 80 MILLIGRAM(S): 80 TABLET, FILM COATED ORAL at 21:44

## 2023-05-21 RX ADMIN — LISINOPRIL 40 MILLIGRAM(S): 2.5 TABLET ORAL at 05:40

## 2023-05-21 RX ADMIN — TAMSULOSIN HYDROCHLORIDE 0.4 MILLIGRAM(S): 0.4 CAPSULE ORAL at 21:44

## 2023-05-21 RX ADMIN — Medication 81 MILLIGRAM(S): at 11:13

## 2023-05-21 RX ADMIN — Medication 112 MICROGRAM(S): at 05:40

## 2023-05-21 RX ADMIN — Medication 1: at 16:39

## 2023-05-21 RX ADMIN — CHLORHEXIDINE GLUCONATE 1 APPLICATION(S): 213 SOLUTION TOPICAL at 05:40

## 2023-05-21 RX ADMIN — Medication 1: at 07:48

## 2023-05-21 RX ADMIN — AMLODIPINE BESYLATE 10 MILLIGRAM(S): 2.5 TABLET ORAL at 05:40

## 2023-05-21 RX ADMIN — Medication 3: at 11:13

## 2023-05-21 NOTE — PROGRESS NOTE ADULT - ASSESSMENT
62y Female with PMHx of Advanced Dementia, DM, hypothyroidism, HTN who presented to the ED due to unresponsiveness in the setting of hypoglycemia, stroke code called in the ED due to confusion even with improvement of blood sugar. PAtient is unable to provide a history,  was unreachqable. Plan discussed with DR. Angie mckenna, patient is on basaglar 30 units at home and was advised to take novolog but never started it. As per  sunil mckenna patient was not started on orals as her c- peptide was low 0.9     #Hypoglycemia   - in a patient with Diabetes MEllitus on Insulin, most likely due to accidental insulin overdose  - Home regimen is basaglar 30 units, novolog advised but does not take it    LAst a1c 9.5 in Nov 2022, repeat a1c on this visit 10.2  - c- peptide noted to be 0.1 but no serum glucose drawn with it , blood glucose readings at the time were low  - as per ouptatient endocirnologist there was suspicion that patient had low pancreatic reserve, and a1c 10.2 Would Recommend DC on low dose toujeo/tresiba 6 units and lispro sliding scale 1 unit for every 50 mg/dl above 150 mg/dl   - will need close outpatient followup with Endocrinologist Dr. Ramos

## 2023-05-21 NOTE — PROGRESS NOTE ADULT - SUBJECTIVE AND OBJECTIVE BOX
REocnsult placed For discharge recommendations.  blood glucose readings under fair control with current regimen

## 2023-05-21 NOTE — PROGRESS NOTE ADULT - SUBJECTIVE AND OBJECTIVE BOX
Patient is a 62y old  Female who presents with a chief complaint of Hypoglycemia     (19 May 2023 13:20)    INTERVAL HPI/OVERNIGHT EVENTS: Patient was examined and seen at bedside. This morning pt is resting comfortably in bed and reports no new issues or overnight events. No complaints. Wants to be left alone and sleep. Poor historian. Worked w/ PT. Eating better.  ROS: Denies CP, SOB, AP, new weakness  All other systems reviewed and are within normal limits.  InitialHPI:  HPI: 62y Female with PMHx of Advanced Dementia, DM, hypothyroidism, HTN who presented to the ED due to unresponsiveness in the setting of hypoglycemia, stroke code called in the ED due to confusion even with improvement of blood sugar. The patient's  saw her normal at 5am this morning when he checked her blood sugar which was 80 gave her insulin & levothyroxine and when he came back at 1130am she was unresponsive and foaming from the mouth. Checked her blood sugar which was in the 30s at this time so he called 911. When EMS arrived patient was diaphoretic and cold to the touch, FS was 47 at this time, therefore, dextrose and orange juice were given. Patient became more awake, however, confused and agitated.  states that she has had issues with hypoglycemia in the past, however, this is the most unresponsive she has been secondary to hypoglycemia. No history of seizures.   On arrival to the ED, patient moving all extremities antigravity with right gaze preference which spontaneously overcome. Patient required 2mg of Versed to tolerate CT scan.      In the ER:  Vitals: /80, HR 75, RR 14, T 36.8 oral, SpO2 98% RA  EKG: Sinus Rythm with 1st degree AV block   Sig Labs: Hgb 9.9, Cr 0.5, Trop -ve   Imagin) CXR Left lung opacification is felt to be artifactual in nature. However, a true frontal x-ray is recommended for further evaluation  2) CT Head + Angio: No acute infarct  (16 May 2023 16:39)    PAST MEDICAL & SURGICAL HISTORY:  Asymptomatic hypertension      Borderline hypercholesterolemia      Adult hypothyroidism      Atypical diabetes mellitus          General: NAD, AAOx2  HEENT:  EOMI, no LAD  CV: S1 S2  Resp: decreased breath sounds at bases  GI: NT/ND/S +BS  MS: no clubbing/cyanosis/edema, + pulses b/l  Neuro: nonfocal, +reflexes thruout          Home Medications:  amLODIPine 10 mg oral tablet: 1 tab(s) orally once a day (2019 08:59)  atorvastatin 10 mg oral tablet: 1 tab(s) orally once a day (2019 08:59)  hydrALAZINE 10 mg oral tablet: orally once a day (2019 08:59)  NovoLOG 100 units/mL subcutaneous solution: unit(s) subcutaneous once a day (2019 08:59)  Synthroid 112 mcg (0.112 mg) oral tablet: 1 tab(s) orally once a day (2019 08:59)    MEDICATIONS  (STANDING):  amLODIPine   Tablet 10 milliGRAM(s) Oral daily  aspirin  chewable 81 milliGRAM(s) Oral daily  atorvastatin 80 milliGRAM(s) Oral at bedtime  chlorhexidine 2% Cloths 1 Application(s) Topical <User Schedule>  dextrose 5%. 1000 milliLiter(s) (50 mL/Hr) IV Continuous <Continuous>  dextrose 5%. 1000 milliLiter(s) (100 mL/Hr) IV Continuous <Continuous>  dextrose 50% Injectable 12.5 Gram(s) IV Push once  dextrose 50% Injectable 25 Gram(s) IV Push once  dextrose 50% Injectable 25 Gram(s) IV Push once  enoxaparin Injectable 40 milliGRAM(s) SubCutaneous every 24 hours  glucagon  Injectable 1 milliGRAM(s) IntraMuscular once  insulin lispro (ADMELOG) corrective regimen sliding scale   SubCutaneous three times a day before meals  levothyroxine 112 MICROGram(s) Oral daily  lisinopril 40 milliGRAM(s) Oral daily  metFORMIN 500 milliGRAM(s) Oral daily  NIFEdipine IR 10 milliGRAM(s) Oral once  tamsulosin 0.4 milliGRAM(s) Oral at bedtime    MEDICATIONS  (PRN):  dextrose Oral Gel 15 Gram(s) Oral once PRN Blood Glucose LESS THAN 70 milliGRAM(s)/deciliter    Vital Signs Last 24 Hrs  T(C): 36.4 (21 May 2023 04:24), Max: 36.8 (20 May 2023 22:00)  T(F): 97.5 (21 May 2023 04:24), Max: 98.2 (20 May 2023 22:00)  HR: 65 (21 May 2023 04:24) (65 - 72)  BP: 134/80 (21 May 2023 04:24) (124/58 - 164/72)  BP(mean): 100 (21 May 2023 04:24) (83 - 100)  RR: 18 (21 May 2023 04:24) (18 - 18)  SpO2: --    Parameters below as of 21 May 2023 04:24  Patient On (Oxygen Delivery Method): room air      CAPILLARY BLOOD GLUCOSE      POCT Blood Glucose.: 269 mg/dL (21 May 2023 11:09)  POCT Blood Glucose.: 152 mg/dL (21 May 2023 07:07)  POCT Blood Glucose.: 221 mg/dL (20 May 2023 21:12)  POCT Blood Glucose.: 164 mg/dL (20 May 2023 16:34)    LABS:                        10.5   8.11  )-----------( 263      ( 21 May 2023 07:48 )             34.0     05-21    136  |  105  |  17  ----------------------------<  170<H>  4.6   |  24  |  0.6<L>    Ca    8.8      21 May 2023 07:48  Mg     1.8     -21                        Consultant Notes Reviewed:  [x ] YES  [ ] NO  Care Discussed with Consultants/Other Providers/ Housestaff [ x] YES  [ ] NO  Radiology, labs, new studies personally reviewed.                                                                   Patient is a 62y old  Female who presents with a chief complaint of Hypoglycemia     (19 May 2023 13:20)    INTERVAL HPI/OVERNIGHT EVENTS: Patient was examined and seen at bedside. This morning pt is resting comfortably in bed and reports no new issues or overnight events. No complaints. Wants to be left alone and sleep. Poor historian. Worked w/ PT. Eating better. Getting straight cathed  ROS: Denies CP, SOB, AP, new weakness  All other systems reviewed and are within normal limits.  InitialHPI:  HPI: 62y Female with PMHx of Advanced Dementia, DM, hypothyroidism, HTN who presented to the ED due to unresponsiveness in the setting of hypoglycemia, stroke code called in the ED due to confusion even with improvement of blood sugar. The patient's  saw her normal at 5am this morning when he checked her blood sugar which was 80 gave her insulin & levothyroxine and when he came back at 1130am she was unresponsive and foaming from the mouth. Checked her blood sugar which was in the 30s at this time so he called 911. When EMS arrived patient was diaphoretic and cold to the touch, FS was 47 at this time, therefore, dextrose and orange juice were given. Patient became more awake, however, confused and agitated.  states that she has had issues with hypoglycemia in the past, however, this is the most unresponsive she has been secondary to hypoglycemia. No history of seizures.   On arrival to the ED, patient moving all extremities antigravity with right gaze preference which spontaneously overcome. Patient required 2mg of Versed to tolerate CT scan.      In the ER:  Vitals: /80, HR 75, RR 14, T 36.8 oral, SpO2 98% RA  EKG: Sinus Rythm with 1st degree AV block   Sig Labs: Hgb 9.9, Cr 0.5, Trop -ve   Imagin) CXR Left lung opacification is felt to be artifactual in nature. However, a true frontal x-ray is recommended for further evaluation  2) CT Head + Angio: No acute infarct  (16 May 2023 16:39)    PAST MEDICAL & SURGICAL HISTORY:  Asymptomatic hypertension      Borderline hypercholesterolemia      Adult hypothyroidism      Atypical diabetes mellitus          General: NAD, AAOx2  HEENT:  EOMI, no LAD  CV: S1 S2  Resp: decreased breath sounds at bases  GI: NT/ND/S +BS  MS: no clubbing/cyanosis/edema, + pulses b/l  Neuro: nonfocal, +reflexes thruout          Home Medications:  amLODIPine 10 mg oral tablet: 1 tab(s) orally once a day (2019 08:59)  atorvastatin 10 mg oral tablet: 1 tab(s) orally once a day (2019 08:59)  hydrALAZINE 10 mg oral tablet: orally once a day (2019 08:59)  NovoLOG 100 units/mL subcutaneous solution: unit(s) subcutaneous once a day (2019 08:59)  Synthroid 112 mcg (0.112 mg) oral tablet: 1 tab(s) orally once a day (2019 08:59)    MEDICATIONS  (STANDING):  amLODIPine   Tablet 10 milliGRAM(s) Oral daily  aspirin  chewable 81 milliGRAM(s) Oral daily  atorvastatin 80 milliGRAM(s) Oral at bedtime  chlorhexidine 2% Cloths 1 Application(s) Topical <User Schedule>  dextrose 5%. 1000 milliLiter(s) (50 mL/Hr) IV Continuous <Continuous>  dextrose 5%. 1000 milliLiter(s) (100 mL/Hr) IV Continuous <Continuous>  dextrose 50% Injectable 12.5 Gram(s) IV Push once  dextrose 50% Injectable 25 Gram(s) IV Push once  dextrose 50% Injectable 25 Gram(s) IV Push once  enoxaparin Injectable 40 milliGRAM(s) SubCutaneous every 24 hours  glucagon  Injectable 1 milliGRAM(s) IntraMuscular once  insulin lispro (ADMELOG) corrective regimen sliding scale   SubCutaneous three times a day before meals  levothyroxine 112 MICROGram(s) Oral daily  lisinopril 40 milliGRAM(s) Oral daily  metFORMIN 500 milliGRAM(s) Oral daily  NIFEdipine IR 10 milliGRAM(s) Oral once  tamsulosin 0.4 milliGRAM(s) Oral at bedtime    MEDICATIONS  (PRN):  dextrose Oral Gel 15 Gram(s) Oral once PRN Blood Glucose LESS THAN 70 milliGRAM(s)/deciliter    Vital Signs Last 24 Hrs  T(C): 36.4 (21 May 2023 04:24), Max: 36.8 (20 May 2023 22:00)  T(F): 97.5 (21 May 2023 04:24), Max: 98.2 (20 May 2023 22:00)  HR: 65 (21 May 2023 04:24) (65 - 72)  BP: 134/80 (21 May 2023 04:24) (124/58 - 164/72)  BP(mean): 100 (21 May 2023 04:24) (83 - 100)  RR: 18 (21 May 2023 04:24) (18 - 18)  SpO2: --    Parameters below as of 21 May 2023 04:24  Patient On (Oxygen Delivery Method): room air      CAPILLARY BLOOD GLUCOSE      POCT Blood Glucose.: 269 mg/dL (21 May 2023 11:09)  POCT Blood Glucose.: 152 mg/dL (21 May 2023 07:07)  POCT Blood Glucose.: 221 mg/dL (20 May 2023 21:12)  POCT Blood Glucose.: 164 mg/dL (20 May 2023 16:34)    LABS:                        10.5   8.11  )-----------( 263      ( 21 May 2023 07:48 )             34.0     05-21    136  |  105  |  17  ----------------------------<  170<H>  4.6   |  24  |  0.6<L>    Ca    8.8      21 May 2023 07:48  Mg     1.8     05-21                        Consultant Notes Reviewed:  [x ] YES  [ ] NO  Care Discussed with Consultants/Other Providers/ Housestaff [ x] YES  [ ] NO  Radiology, labs, new studies personally reviewed.

## 2023-05-21 NOTE — PROGRESS NOTE ADULT - ASSESSMENT
62y Female with PMHx of Advanced Dementia, DM, hypothyroidism, HTN who presented to the ED due to unresponsiveness in the setting of hypoglycemia, stroke code called in the ED due to confusion even with improvement of blood sugar. Plan discussed with DR. Angie mckenna, patient is on basaglar 30 units at home and was advised to take novolog but never started it. As per primary endo patient was not started on orals as her c- peptide was low-nl 0.9     # Syncope/?Seizure in setting of hypoglycemia   # IDDM  # Hypothyroidism   # HTN  # Dementia   - Baseline AOx2,  - PT consult,  consult   - Workup negative for acute stroke   - EEG negative  - will d/w endo outpt recs ?orals  - Fall precautions   - C/w home amlodipine 10, Levothyroxine 112, Lisinopril 40, and atorvastatin 80 QD  - Home regimen is basaglar 30 units, novolog advised but does not take it   - FS have been stable on just ISS  - needs  to administer Insulin  - f/u B12, RPR, ammonia, TSH  - as per , dementia is long standing and gradually progressing (needs help with all ADLs). As per , pt is mostly at baseline  -  w/ poor medical literacy.  -  wants pt home even if she will need hospital bed (previously was able to ambulate) and declines SNF  - if FS cont to go up, will start on a low dose Lantus  - endo f/u for outpt regimen  - CM f/u for ?need for DME    # DVT prophylaxis - Lovenox 40 QD   # GI prophylaxis  - Not Indicated   # Diet - DASH/TLC/CC  # Activity Score (AM-PAC) - IAT  # Code status - Full.    High risk pt.    #Progress Note Handoff  Pending: Clinical improvement and stability__x___PT____x___?DMEs, Endo_  Pt/Family discussion:  informed and agrees with the current plan  Disposition: Home______    My note supersedes the residents note should a discrepancy arise.    Chart and notes personally reviewed.  Care Discussed with Consultants/Other Providers/ Housestaff [ x] YES [ ] NO   Radiology, labs, old records personally reviewed.    discussed w/ housestaff, nursing, case management    Attestation Statements:    Attestation Statements:  Risk Statement (NON-critical care).     On this date of service, level of risk to patient is considered: High.     Due to: hypoglycemia, progression of dementia    Time-based billing (NON-critical care).     50 minutes spent on total encounter. The necessity of the time spent during the encounter on this date of service was due to:     time spent on review of labs, imaging studies, old records, obtaining history, personally examining patient, counselling and communicating with patient/ family, entering orders for medications/tests/etc, discussions with other health care providers, documentation in electronic health records, independent interpretation of labs, imaging/procedure results and care coordination.         62y Female with PMHx of Advanced Dementia, DM, hypothyroidism, HTN who presented to the ED due to unresponsiveness in the setting of hypoglycemia, stroke code called in the ED due to confusion even with improvement of blood sugar. Plan discussed with DR. Angie mckenna, patient is on basaglar 30 units at home and was advised to take novolog but never started it. As per primary endo patient was not started on orals as her c- peptide was low-nl 0.9     # Syncope/?Seizure in setting of hypoglycemia   # IDDM  # Hypothyroidism   # HTN  # Dementia   - Baseline AOx2,  - PT consult,  consult   - Workup negative for acute stroke   - EEG negative  - will d/w endo outpt recs ?orals  - Fall precautions   - C/w home amlodipine 10, Levothyroxine 112, Lisinopril 40, and atorvastatin 80 QD  - Home regimen is basaglar 30 units, novolog advised but does not take it   - FS have been stable on just ISS  - needs  to administer Insulin  - f/u B12, RPR, ammonia, TSH  - as per , dementia is long standing and gradually progressing (needs help with all ADLs). As per , pt is mostly at baseline  -  w/ poor medical literacy.  -  wants pt home even if she will need hospital bed (previously was able to ambulate) and declines SNF  - if FS cont to go up, will start on a low dose Lantus  - endo f/u for outpt regimen  - CM f/u for ?need for DME    #Urinary retention  cont CIC, Flomax  likely will need sweet on d/c and outpt  f/u    # DVT prophylaxis - Lovenox 40 QD   # GI prophylaxis  - Not Indicated   # Diet - DASH/TLC/CC  # Activity Score (AM-PAC) - IAT  # Code status - Full.    High risk pt.    #Progress Note Handoff  Pending: Clinical improvement and stability__x___PT____x___?DMEs, Endo_  Pt/Family discussion:  informed and agrees with the current plan  Disposition: Home______    My note supersedes the residents note should a discrepancy arise.    Chart and notes personally reviewed.  Care Discussed with Consultants/Other Providers/ Housestaff [ x] YES [ ] NO   Radiology, labs, old records personally reviewed.    discussed w/ housestaff, nursing, case management    Attestation Statements:    Attestation Statements:  Risk Statement (NON-critical care).     On this date of service, level of risk to patient is considered: High.     Due to: hypoglycemia, progression of dementia    Time-based billing (NON-critical care).     50 minutes spent on total encounter. The necessity of the time spent during the encounter on this date of service was due to:     time spent on review of labs, imaging studies, old records, obtaining history, personally examining patient, counselling and communicating with patient/ family, entering orders for medications/tests/etc, discussions with other health care providers, documentation in electronic health records, independent interpretation of labs, imaging/procedure results and care coordination.

## 2023-05-22 LAB
ANION GAP SERPL CALC-SCNC: 10 MMOL/L — SIGNIFICANT CHANGE UP (ref 7–14)
BUN SERPL-MCNC: 18 MG/DL — SIGNIFICANT CHANGE UP (ref 10–20)
CALCIUM SERPL-MCNC: 9.2 MG/DL — SIGNIFICANT CHANGE UP (ref 8.4–10.5)
CHLORIDE SERPL-SCNC: 104 MMOL/L — SIGNIFICANT CHANGE UP (ref 98–110)
CO2 SERPL-SCNC: 25 MMOL/L — SIGNIFICANT CHANGE UP (ref 17–32)
CREAT SERPL-MCNC: 0.5 MG/DL — LOW (ref 0.7–1.5)
EGFR: 105 ML/MIN/1.73M2 — SIGNIFICANT CHANGE UP
FOLATE SERPL-MCNC: 10.4 NG/ML — SIGNIFICANT CHANGE UP
GLUCOSE BLDC GLUCOMTR-MCNC: 159 MG/DL — HIGH (ref 70–99)
GLUCOSE BLDC GLUCOMTR-MCNC: 177 MG/DL — HIGH (ref 70–99)
GLUCOSE BLDC GLUCOMTR-MCNC: 196 MG/DL — HIGH (ref 70–99)
GLUCOSE BLDC GLUCOMTR-MCNC: 318 MG/DL — HIGH (ref 70–99)
GLUCOSE SERPL-MCNC: 192 MG/DL — HIGH (ref 70–99)
HCT VFR BLD CALC: 33.5 % — LOW (ref 37–47)
HGB BLD-MCNC: 10.4 G/DL — LOW (ref 12–16)
MAGNESIUM SERPL-MCNC: 1.8 MG/DL — SIGNIFICANT CHANGE UP (ref 1.8–2.4)
MCHC RBC-ENTMCNC: 26.4 PG — LOW (ref 27–31)
MCHC RBC-ENTMCNC: 31 G/DL — LOW (ref 32–37)
MCV RBC AUTO: 85 FL — SIGNIFICANT CHANGE UP (ref 81–99)
NRBC # BLD: 0 /100 WBCS — SIGNIFICANT CHANGE UP (ref 0–0)
PLATELET # BLD AUTO: 256 K/UL — SIGNIFICANT CHANGE UP (ref 130–400)
PMV BLD: 10.4 FL — SIGNIFICANT CHANGE UP (ref 7.4–10.4)
POTASSIUM SERPL-MCNC: 4.7 MMOL/L — SIGNIFICANT CHANGE UP (ref 3.5–5)
POTASSIUM SERPL-SCNC: 4.7 MMOL/L — SIGNIFICANT CHANGE UP (ref 3.5–5)
RBC # BLD: 3.94 M/UL — LOW (ref 4.2–5.4)
RBC # FLD: 14.2 % — SIGNIFICANT CHANGE UP (ref 11.5–14.5)
SODIUM SERPL-SCNC: 139 MMOL/L — SIGNIFICANT CHANGE UP (ref 135–146)
T PALLIDUM AB TITR SER: NEGATIVE — SIGNIFICANT CHANGE UP
TSH SERPL-MCNC: 4.9 UIU/ML — HIGH (ref 0.27–4.2)
VIT B12 SERPL-MCNC: 814 PG/ML — SIGNIFICANT CHANGE UP (ref 232–1245)
WBC # BLD: 6.98 K/UL — SIGNIFICANT CHANGE UP (ref 4.8–10.8)
WBC # FLD AUTO: 6.98 K/UL — SIGNIFICANT CHANGE UP (ref 4.8–10.8)

## 2023-05-22 PROCEDURE — 99232 SBSQ HOSP IP/OBS MODERATE 35: CPT

## 2023-05-22 RX ORDER — ATORVASTATIN CALCIUM 80 MG/1
1 TABLET, FILM COATED ORAL
Qty: 0 | Refills: 0 | DISCHARGE

## 2023-05-22 RX ORDER — HYDRALAZINE HCL 50 MG
0 TABLET ORAL
Qty: 0 | Refills: 0 | DISCHARGE

## 2023-05-22 RX ORDER — INSULIN GLARGINE 100 [IU]/ML
4 INJECTION, SOLUTION SUBCUTANEOUS
Qty: 1 | Refills: 0
Start: 2023-05-22 | End: 2023-06-20

## 2023-05-22 RX ORDER — INSULIN GLARGINE 100 [IU]/ML
4 INJECTION, SOLUTION SUBCUTANEOUS
Refills: 0 | Status: DISCONTINUED | OUTPATIENT
Start: 2023-05-22 | End: 2023-05-25

## 2023-05-22 RX ORDER — ATORVASTATIN CALCIUM 80 MG/1
1 TABLET, FILM COATED ORAL
Qty: 30 | Refills: 0
Start: 2023-05-22 | End: 2023-06-20

## 2023-05-22 RX ORDER — LISINOPRIL 2.5 MG/1
1 TABLET ORAL
Qty: 30 | Refills: 0
Start: 2023-05-22 | End: 2023-06-20

## 2023-05-22 RX ORDER — METFORMIN HYDROCHLORIDE 850 MG/1
1 TABLET ORAL
Qty: 30 | Refills: 0
Start: 2023-05-22 | End: 2023-06-20

## 2023-05-22 RX ORDER — TAMSULOSIN HYDROCHLORIDE 0.4 MG/1
1 CAPSULE ORAL
Qty: 0 | Refills: 0 | DISCHARGE
Start: 2023-05-22

## 2023-05-22 RX ORDER — INSULIN ASPART 100 [IU]/ML
0 INJECTION, SOLUTION SUBCUTANEOUS
Qty: 0 | Refills: 0 | DISCHARGE

## 2023-05-22 RX ORDER — INSULIN LISPRO 100/ML
1 VIAL (ML) SUBCUTANEOUS
Qty: 10 | Refills: 0
Start: 2023-05-22 | End: 2023-06-20

## 2023-05-22 RX ORDER — ASPIRIN/CALCIUM CARB/MAGNESIUM 324 MG
1 TABLET ORAL
Qty: 90 | Refills: 0
Start: 2023-05-22 | End: 2023-08-19

## 2023-05-22 RX ORDER — NIFEDIPINE 30 MG
1 TABLET, EXTENDED RELEASE 24 HR ORAL
Qty: 1 | Refills: 0
Start: 2023-05-22 | End: 2023-08-19

## 2023-05-22 RX ADMIN — TAMSULOSIN HYDROCHLORIDE 0.4 MILLIGRAM(S): 0.4 CAPSULE ORAL at 21:15

## 2023-05-22 RX ADMIN — Medication 4: at 11:20

## 2023-05-22 RX ADMIN — METFORMIN HYDROCHLORIDE 500 MILLIGRAM(S): 850 TABLET ORAL at 11:04

## 2023-05-22 RX ADMIN — INSULIN GLARGINE 4 UNIT(S): 100 INJECTION, SOLUTION SUBCUTANEOUS at 17:37

## 2023-05-22 RX ADMIN — ATORVASTATIN CALCIUM 80 MILLIGRAM(S): 80 TABLET, FILM COATED ORAL at 21:15

## 2023-05-22 RX ADMIN — Medication 81 MILLIGRAM(S): at 11:04

## 2023-05-22 RX ADMIN — CHLORHEXIDINE GLUCONATE 1 APPLICATION(S): 213 SOLUTION TOPICAL at 05:39

## 2023-05-22 RX ADMIN — Medication 1: at 17:37

## 2023-05-22 RX ADMIN — Medication 112 MICROGRAM(S): at 05:37

## 2023-05-22 NOTE — PROGRESS NOTE ADULT - SUBJECTIVE AND OBJECTIVE BOX
LULI TAYLOR 62y Female  MRN#: [id number]     Hospital Day: 6d    Pt is currently admitted with the primary diagnosis of HYPOGLYCEMIA    Overnight events   -No major overnight events                                          ----------------------------------------------------------  OBJECTIVE  PAST MEDICAL & SURGICAL HISTORY  Asymptomatic hypertension    Borderline hypercholesterolemia    Adult hypothyroidism    Atypical diabetes mellitus                                              -----------------------------------------------------------  MEDICATIONS:  STANDING MEDICATIONS  amLODIPine   Tablet 10 milliGRAM(s) Oral daily  aspirin  chewable 81 milliGRAM(s) Oral daily  atorvastatin 80 milliGRAM(s) Oral at bedtime  chlorhexidine 2% Cloths 1 Application(s) Topical <User Schedule>  dextrose 5%. 1000 milliLiter(s) IV Continuous <Continuous>  dextrose 5%. 1000 milliLiter(s) IV Continuous <Continuous>  dextrose 50% Injectable 25 Gram(s) IV Push once  dextrose 50% Injectable 12.5 Gram(s) IV Push once  dextrose 50% Injectable 25 Gram(s) IV Push once  enoxaparin Injectable 40 milliGRAM(s) SubCutaneous every 24 hours  glucagon  Injectable 1 milliGRAM(s) IntraMuscular once  insulin glargine Injectable (LANTUS) 4 Unit(s) SubCutaneous <User Schedule>  insulin lispro (ADMELOG) corrective regimen sliding scale   SubCutaneous three times a day before meals  levothyroxine 112 MICROGram(s) Oral daily  lisinopril 40 milliGRAM(s) Oral daily  metFORMIN 500 milliGRAM(s) Oral daily  NIFEdipine IR 10 milliGRAM(s) Oral once  tamsulosin 0.4 milliGRAM(s) Oral at bedtime    PRN MEDICATIONS  dextrose Oral Gel 15 Gram(s) Oral once PRN                                            ------------------------------------------------------------  VITAL SIGNS: Last 24 Hours  T(C): 35.8 (22 May 2023 14:00), Max: 36.9 (22 May 2023 04:00)  T(F): 96.5 (22 May 2023 14:00), Max: 98.4 (22 May 2023 04:00)  HR: 66 (22 May 2023 14:00) (66 - 67)  BP: 119/56 (22 May 2023 14:00) (119/56 - 143/70)  BP(mean): --  RR: 18 (22 May 2023 14:00) (18 - 18)  SpO2: 98% (22 May 2023 04:00) (98% - 98%)                                             --------------------------------------------------------------  LABS:                        10.4   6.98  )-----------( 256      ( 22 May 2023 06:34 )             33.5     05-22    139  |  104  |  18  ----------------------------<  192<H>  4.7   |  25  |  0.5<L>    Ca    9.2      22 May 2023 06:34  Mg     1.8     05-22                                                                --------------------------------------------------------------  PHYSICAL EXAM:  GENERAL: Awake, alert. Well-nourished, laying in bed appearing in no acute distress  HEENT: No FNDs, atraumatic, normocephalic  LUNGS: Clear to auscultation bilaterally  HEART: S1/S2. No heaves or thrills  ABD: Soft, non-tender, non-distended.  EXT/NEURO: Strength, sensation and ROM grossly intact.  SKIN: No edema    PLAN:  62y Female with PMHx of Advanced Dementia, DM, hypothyroidism, HTN who presented to the ED due to unresponsiveness in the setting of hypoglycemia, stroke code called in the ED due to confusion even with improvement of blood sugar. Plan discussed with DR. Angie barber endo, patient is on basaglar 30 units at home and was advised to take novolog but never started it. As per primary endo patient was not started on orals as her c- peptide was low-nl 0.9     # Syncope/?Seizure in setting of hypoglycemia   # IDDM  # Hypothyroidism   # HTN  # Dementia   - Workup negative for acute stroke   - EEG negative  - C/w home amlodipine 10, Levothyroxine 112, Lisinopril 40, and atorvastatin 80 QD  - Home regimen is basaglar 30 units, novolog advised but does not take it   -start basal 4 units, SSI prn     #Urinary retention  cont bladder scans Q shift, Flomax    # DVT prophylaxis   -lovenox    # Diet   -dash/TLC    # Activity Score (AM-PAC)  -basic/daily 12    #Progress Note Handoff  Pending (specify):  Family discussion:   Disposition:

## 2023-05-22 NOTE — PROGRESS NOTE ADULT - ASSESSMENT
62y Female with PMHx of Advanced Dementia, DM, hypothyroidism, HTN who presented to the ED due to unresponsiveness in the setting of hypoglycemia, stroke code called in the ED due to confusion even with improvement of blood sugar. Plan discussed with DR. Angie mckenna, patient is on basaglar 30 units at home and was advised to take novolog but never started it. As per primary endo patient was not started on orals as her c- peptide was low-nl 0.9     # Syncope/?Seizure in setting of hypoglycemia   # IDDM  # Hypothyroidism   # HTN  # Dementia   - Baseline AOx2,  - PT consult,  consult   - Workup negative for acute stroke   - EEG negative  - will d/w endo outpt recs ?orals  - Fall precautions   - C/w home amlodipine 10, Levothyroxine 112, Lisinopril 40, and atorvastatin 80 QD  - Home regimen is basaglar 30 units, novolog advised but does not take it   - FS have been stable on just ISS  - needs  to administer Insulin  - f/u B12, RPR, ammonia, TSH  - as per , dementia is long standing and gradually progressing (needs help with all ADLs). As per , pt is mostly at baseline  -  w/ poor medical literacy.  -  wants pt home even if she will need hospital bed (previously was able to ambulate) and declines SNF  - if FS cont to go up, will start on a low dose Lantus  - CM f/u for ?need for DME  - appreciate endo f/u. Will start on Lantus 4u daily and Lispro SS    #Urinary retention  cont bladder scans Q shift, Flomax      # DVT prophylaxis - Lovenox 40 QD   # GI prophylaxis  - Not Indicated   # Diet - DASH/TLC/CC  # Activity Score (AM-PAC) - IAT  # Code status - Full.    Dispo; pt is medically stable for discharge. Previously  insisted on discharge home but today when told that pt is being discharged home, he is now considering SNF. NILES sent.    #Progress Note Handoff  Pending: Clinical improvement and stability__x___PT____x__  Pt/Family discussion:  informed and agrees with the current plan  Disposition:  is now considering SNF    My note supersedes the residents note should a discrepancy arise.    Chart and notes personally reviewed.  Care Discussed with Consultants/Other Providers/ Housestaff [ x] YES [ ] NO   Radiology, labs, old records personally reviewed.    Time-based billing (NON-critical care).     35 minutes spent on total encounter. The necessity of the time spent during the encounter on this date of service was due to:     time spent on review of labs, imaging studies, old records, obtaining history, personally examining patient, counselling and communicating with patient/ family, entering orders for medications/tests/etc, discussions with other health care providers, documentation in electronic health records, independent interpretation of labs, imaging/procedure results and care coordination.         62y Female with PMHx of Advanced Dementia, DM, hypothyroidism, HTN who presented to the ED due to unresponsiveness in the setting of hypoglycemia, stroke code called in the ED due to confusion even with improvement of blood sugar. Plan discussed with DR. Angie mckenna, patient is on basaglar 30 units at home and was advised to take novolog but never started it. As per primary endo patient was not started on orals as her c- peptide was low-nl 0.9     # Syncope/?Seizure in setting of hypoglycemia   # IDDM  # Hypothyroidism   # HTN  # Dementia   - Baseline AOx2,  - PT consult,  consult   - Workup negative for acute stroke   - EEG negative  - will d/w endo outpt recs ?orals  - Fall precautions   - C/w home amlodipine 10, Levothyroxine 112, Lisinopril 40, and atorvastatin 80 QD  - Home regimen is basaglar 30 units, novolog advised but does not take it   - FS have been stable on just ISS  - needs  to administer Insulin  - f/u B12, RPR, ammonia, TSH  - as per , dementia is long standing and gradually progressing (needs help with all ADLs). As per , pt is mostly at baseline  -  w/ poor medical literacy.  - 5/22: appreciate endo f/u. Will start on Lantus 4u daily and Lispro SS  - outpt neuro f/u     #Urinary retention  cont bladder scans Q shift, Flomax      # DVT prophylaxis - Lovenox 40 QD   # GI prophylaxis  - Not Indicated   # Diet - DASH/TLC/CC  # Activity Score (AM-PAC) - IAT  # Code status - Full.    Dispo; pt is medically stable for discharge. Previously  insisted on discharge home but today when told that pt is being discharged home, he is now considering SNF. NILES sent.    #Progress Note Handoff  Pending: Clinical improvement and stability__x___PT____x__  Pt/Family discussion:  informed and agrees with the current plan  Disposition:  is now considering SNF    My note supersedes the residents note should a discrepancy arise.    Chart and notes personally reviewed.  Care Discussed with Consultants/Other Providers/ Housestaff [ x] YES [ ] NO   Radiology, labs, old records personally reviewed.    Time-based billing (NON-critical care).     35 minutes spent on total encounter. The necessity of the time spent during the encounter on this date of service was due to:     time spent on review of labs, imaging studies, old records, obtaining history, personally examining patient, counselling and communicating with patient/ family, entering orders for medications/tests/etc, discussions with other health care providers, documentation in electronic health records, independent interpretation of labs, imaging/procedure results and care coordination.

## 2023-05-22 NOTE — PROGRESS NOTE ADULT - SUBJECTIVE AND OBJECTIVE BOX
Patient is a 62y old  Female who presents with a chief complaint of Hypoglycemia     (19 May 2023 13:20)    INTERVAL HPI/OVERNIGHT EVENTS: Patient was examined and seen at bedside. This morning pt is resting comfortably in bed and reports no new issues or overnight events. No complaints. Poor historian. Worked w/ PT and ambulated >50ft. Eating better. Bladder scans w/out significant residual the last 24hrs.  ROS: Denies CP, SOB, AP, new weakness  All other systems reviewed and are within normal limits.  InitialHPI:  HPI: 62y Female with PMHx of Advanced Dementia, DM, hypothyroidism, HTN who presented to the ED due to unresponsiveness in the setting of hypoglycemia, stroke code called in the ED due to confusion even with improvement of blood sugar. The patient's  saw her normal at 5am this morning when he checked her blood sugar which was 80 gave her insulin & levothyroxine and when he came back at 1130am she was unresponsive and foaming from the mouth. Checked her blood sugar which was in the 30s at this time so he called 911. When EMS arrived patient was diaphoretic and cold to the touch, FS was 47 at this time, therefore, dextrose and orange juice were given. Patient became more awake, however, confused and agitated.  states that she has had issues with hypoglycemia in the past, however, this is the most unresponsive she has been secondary to hypoglycemia. No history of seizures.   On arrival to the ED, patient moving all extremities antigravity with right gaze preference which spontaneously overcome. Patient required 2mg of Versed to tolerate CT scan.      In the ER:  Vitals: /80, HR 75, RR 14, T 36.8 oral, SpO2 98% RA  EKG: Sinus Rythm with 1st degree AV block   Sig Labs: Hgb 9.9, Cr 0.5, Trop -ve   Imagin) CXR Left lung opacification is felt to be artifactual in nature. However, a true frontal x-ray is recommended for further evaluation  2) CT Head + Angio: No acute infarct  (16 May 2023 16:39)    PAST MEDICAL & SURGICAL HISTORY:  Asymptomatic hypertension      Borderline hypercholesterolemia      Adult hypothyroidism      Atypical diabetes mellitus          General: NAD, AAOx2  HEENT:  EOMI, no LAD  CV: S1 S2  Resp: decreased breath sounds at bases  GI: NT/ND/S +BS  MS: no clubbing/cyanosis/edema, + pulses b/l  Neuro: nonfocal, +reflexes thruout          Home Medications:  amLODIPine 10 mg oral tablet: 1 tab(s) orally once a day (2019 08:59)  atorvastatin 10 mg oral tablet: 1 tab(s) orally once a day (2019 08:59)  hydrALAZINE 10 mg oral tablet: orally once a day (2019 08:59)  NovoLOG 100 units/mL subcutaneous solution: unit(s) subcutaneous once a day (2019 08:59)  Synthroid 112 mcg (0.112 mg) oral tablet: 1 tab(s) orally once a day (2019 08:59)    MEDICATIONS  (STANDING):  amLODIPine   Tablet 10 milliGRAM(s) Oral daily  aspirin  chewable 81 milliGRAM(s) Oral daily  atorvastatin 80 milliGRAM(s) Oral at bedtime  chlorhexidine 2% Cloths 1 Application(s) Topical <User Schedule>  dextrose 5%. 1000 milliLiter(s) (50 mL/Hr) IV Continuous <Continuous>  dextrose 5%. 1000 milliLiter(s) (100 mL/Hr) IV Continuous <Continuous>  dextrose 50% Injectable 12.5 Gram(s) IV Push once  dextrose 50% Injectable 25 Gram(s) IV Push once  dextrose 50% Injectable 25 Gram(s) IV Push once  enoxaparin Injectable 40 milliGRAM(s) SubCutaneous every 24 hours  glucagon  Injectable 1 milliGRAM(s) IntraMuscular once  insulin glargine Injectable (LANTUS) 4 Unit(s) SubCutaneous <User Schedule>  insulin lispro (ADMELOG) corrective regimen sliding scale   SubCutaneous three times a day before meals  levothyroxine 112 MICROGram(s) Oral daily  lisinopril 40 milliGRAM(s) Oral daily  metFORMIN 500 milliGRAM(s) Oral daily  NIFEdipine IR 10 milliGRAM(s) Oral once  tamsulosin 0.4 milliGRAM(s) Oral at bedtime    MEDICATIONS  (PRN):  dextrose Oral Gel 15 Gram(s) Oral once PRN Blood Glucose LESS THAN 70 milliGRAM(s)/deciliter    Vital Signs Last 24 Hrs  T(C): 35.8 (22 May 2023 14:00), Max: 36.9 (22 May 2023 04:00)  T(F): 96.5 (22 May 2023 14:00), Max: 98.4 (22 May 2023 04:00)  HR: 66 (22 May 2023 14:00) (66 - 67)  BP: 119/56 (22 May 2023 14:00) (119/56 - 143/70)  BP(mean): --  RR: 18 (22 May 2023 14:00) (18 - 18)  SpO2: 98% (22 May 2023 04:00) (98% - 98%)    Parameters below as of 22 May 2023 04:00  Patient On (Oxygen Delivery Method): room air      CAPILLARY BLOOD GLUCOSE      POCT Blood Glucose.: 318 mg/dL (22 May 2023 11:07)  POCT Blood Glucose.: 177 mg/dL (22 May 2023 07:44)  POCT Blood Glucose.: 186 mg/dL (21 May 2023 21:18)  POCT Blood Glucose.: 182 mg/dL (21 May 2023 16:20)    LABS:                        10.4   6.98  )-----------( 256      ( 22 May 2023 06:34 )             33.5         139  |  104  |  18  ----------------------------<  192<H>  4.7   |  25  |  0.5<L>    Ca    9.2      22 May 2023 06:34  Mg     1.8         Consultant Notes Reviewed:  [x ] YES  [ ] NO  Care Discussed with Consultants/Other Providers/ Housestaff [ x] YES  [ ] NO  Radiology, labs, new studies personally reviewed.

## 2023-05-23 LAB
ALBUMIN SERPL ELPH-MCNC: 3 G/DL — LOW (ref 3.5–5.2)
ALP SERPL-CCNC: 164 U/L — HIGH (ref 30–115)
ALT FLD-CCNC: 143 U/L — HIGH (ref 0–41)
ANION GAP SERPL CALC-SCNC: 10 MMOL/L — SIGNIFICANT CHANGE UP (ref 7–14)
AST SERPL-CCNC: 75 U/L — HIGH (ref 0–41)
BILIRUB SERPL-MCNC: <0.2 MG/DL — SIGNIFICANT CHANGE UP (ref 0.2–1.2)
BUN SERPL-MCNC: 17 MG/DL — SIGNIFICANT CHANGE UP (ref 10–20)
CALCIUM SERPL-MCNC: 9.2 MG/DL — SIGNIFICANT CHANGE UP (ref 8.4–10.5)
CHLORIDE SERPL-SCNC: 106 MMOL/L — SIGNIFICANT CHANGE UP (ref 98–110)
CO2 SERPL-SCNC: 25 MMOL/L — SIGNIFICANT CHANGE UP (ref 17–32)
CREAT SERPL-MCNC: 0.6 MG/DL — LOW (ref 0.7–1.5)
EGFR: 100 ML/MIN/1.73M2 — SIGNIFICANT CHANGE UP
GLUCOSE BLDC GLUCOMTR-MCNC: 162 MG/DL — HIGH (ref 70–99)
GLUCOSE BLDC GLUCOMTR-MCNC: 170 MG/DL — HIGH (ref 70–99)
GLUCOSE BLDC GLUCOMTR-MCNC: 212 MG/DL — HIGH (ref 70–99)
GLUCOSE BLDC GLUCOMTR-MCNC: 89 MG/DL — SIGNIFICANT CHANGE UP (ref 70–99)
GLUCOSE SERPL-MCNC: 181 MG/DL — HIGH (ref 70–99)
HCT VFR BLD CALC: 32.7 % — LOW (ref 37–47)
HGB BLD-MCNC: 10.3 G/DL — LOW (ref 12–16)
MAGNESIUM SERPL-MCNC: 2.1 MG/DL — SIGNIFICANT CHANGE UP (ref 1.8–2.4)
MCHC RBC-ENTMCNC: 26.8 PG — LOW (ref 27–31)
MCHC RBC-ENTMCNC: 31.5 G/DL — LOW (ref 32–37)
MCV RBC AUTO: 85.2 FL — SIGNIFICANT CHANGE UP (ref 81–99)
NRBC # BLD: 0 /100 WBCS — SIGNIFICANT CHANGE UP (ref 0–0)
PLATELET # BLD AUTO: 273 K/UL — SIGNIFICANT CHANGE UP (ref 130–400)
PMV BLD: 11 FL — HIGH (ref 7.4–10.4)
POTASSIUM SERPL-MCNC: 4.6 MMOL/L — SIGNIFICANT CHANGE UP (ref 3.5–5)
POTASSIUM SERPL-SCNC: 4.6 MMOL/L — SIGNIFICANT CHANGE UP (ref 3.5–5)
PROT SERPL-MCNC: 5.9 G/DL — LOW (ref 6–8)
RBC # BLD: 3.84 M/UL — LOW (ref 4.2–5.4)
RBC # FLD: 14.5 % — SIGNIFICANT CHANGE UP (ref 11.5–14.5)
SODIUM SERPL-SCNC: 141 MMOL/L — SIGNIFICANT CHANGE UP (ref 135–146)
WBC # BLD: 7.74 K/UL — SIGNIFICANT CHANGE UP (ref 4.8–10.8)
WBC # FLD AUTO: 7.74 K/UL — SIGNIFICANT CHANGE UP (ref 4.8–10.8)

## 2023-05-23 PROCEDURE — 99232 SBSQ HOSP IP/OBS MODERATE 35: CPT

## 2023-05-23 RX ADMIN — Medication 112 MICROGRAM(S): at 05:29

## 2023-05-23 RX ADMIN — ENOXAPARIN SODIUM 40 MILLIGRAM(S): 100 INJECTION SUBCUTANEOUS at 01:45

## 2023-05-23 RX ADMIN — Medication 2: at 16:55

## 2023-05-23 RX ADMIN — INSULIN GLARGINE 4 UNIT(S): 100 INJECTION, SOLUTION SUBCUTANEOUS at 11:22

## 2023-05-23 RX ADMIN — Medication 81 MILLIGRAM(S): at 11:21

## 2023-05-23 RX ADMIN — LISINOPRIL 40 MILLIGRAM(S): 2.5 TABLET ORAL at 05:28

## 2023-05-23 RX ADMIN — CHLORHEXIDINE GLUCONATE 1 APPLICATION(S): 213 SOLUTION TOPICAL at 05:30

## 2023-05-23 RX ADMIN — Medication 1: at 11:21

## 2023-05-23 RX ADMIN — Medication 1: at 07:53

## 2023-05-23 RX ADMIN — TAMSULOSIN HYDROCHLORIDE 0.4 MILLIGRAM(S): 0.4 CAPSULE ORAL at 21:12

## 2023-05-23 RX ADMIN — ENOXAPARIN SODIUM 40 MILLIGRAM(S): 100 INJECTION SUBCUTANEOUS at 22:42

## 2023-05-23 RX ADMIN — METFORMIN HYDROCHLORIDE 500 MILLIGRAM(S): 850 TABLET ORAL at 11:21

## 2023-05-23 RX ADMIN — AMLODIPINE BESYLATE 10 MILLIGRAM(S): 2.5 TABLET ORAL at 05:28

## 2023-05-23 NOTE — PROGRESS NOTE ADULT - SUBJECTIVE AND OBJECTIVE BOX
LULI TAYLOR 62y Female  MRN#: [id number]     Hospital Day: 7d    Pt is currently admitted with the primary diagnosis of HYPOGLYCEMIA    Overnight events   -No major overnight events                                          ----------------------------------------------------------  OBJECTIVE  PAST MEDICAL & SURGICAL HISTORY  Asymptomatic hypertension    Borderline hypercholesterolemia    Adult hypothyroidism    Atypical diabetes mellitus                                              -----------------------------------------------------------  MEDICATIONS:  STANDING MEDICATIONS  amLODIPine   Tablet 10 milliGRAM(s) Oral daily  aspirin  chewable 81 milliGRAM(s) Oral daily  chlorhexidine 2% Cloths 1 Application(s) Topical <User Schedule>  dextrose 5%. 1000 milliLiter(s) IV Continuous <Continuous>  dextrose 5%. 1000 milliLiter(s) IV Continuous <Continuous>  dextrose 50% Injectable 25 Gram(s) IV Push once  dextrose 50% Injectable 12.5 Gram(s) IV Push once  dextrose 50% Injectable 25 Gram(s) IV Push once  enoxaparin Injectable 40 milliGRAM(s) SubCutaneous every 24 hours  glucagon  Injectable 1 milliGRAM(s) IntraMuscular once  insulin glargine Injectable (LANTUS) 4 Unit(s) SubCutaneous <User Schedule>  insulin lispro (ADMELOG) corrective regimen sliding scale   SubCutaneous three times a day before meals  levothyroxine 112 MICROGram(s) Oral daily  lisinopril 40 milliGRAM(s) Oral daily  metFORMIN 500 milliGRAM(s) Oral daily  NIFEdipine IR 10 milliGRAM(s) Oral once  tamsulosin 0.4 milliGRAM(s) Oral at bedtime    PRN MEDICATIONS  dextrose Oral Gel 15 Gram(s) Oral once PRN                                            ------------------------------------------------------------  VITAL SIGNS: Last 24 Hours  T(C): 35.9 (23 May 2023 06:00), Max: 35.9 (23 May 2023 06:00)  T(F): 96.7 (23 May 2023 06:00), Max: 96.7 (23 May 2023 06:00)  HR: 60 (23 May 2023 06:00) (60 - 70)  BP: 124/57 (23 May 2023 06:00) (119/56 - 157/72)  BP(mean): 82 (23 May 2023 06:00) (82 - 103)  RR: 18 (23 May 2023 06:00) (18 - 18)  SpO2: --      05-22-23 @ 07:01  -  05-23-23 @ 07:00  --------------------------------------------------------  IN: 120 mL / OUT: 0 mL / NET: 120 mL                                             --------------------------------------------------------------  LABS:                        10.3   7.74  )-----------( 273      ( 23 May 2023 05:58 )             32.7     05-23    141  |  106  |  17  ----------------------------<  181<H>  4.6   |  25  |  0.6<L>    Ca    9.2      23 May 2023 05:58  Mg     2.1     05-23    TPro  5.9<L>  /  Alb  3.0<L>  /  TBili  <0.2  /  DBili  x   /  AST  75<H>  /  ALT  143<H>  /  AlkPhos  164<H>  05-23                                                              --------------------------------------------------------------  PHYSICAL EXAM:  GENERAL: Awake, alert. Well-nourished, laying in bed appearing in no acute distress  HEENT: No FNDs, atraumatic, normocephalic  LUNGS: Clear to auscultation bilaterally  HEART: S1/S2. No heaves or thrills  ABD: Soft, non-tender, non-distended.  EXT/NEURO: Strength, sensation and ROM grossly intact.  SKIN: No edema    PLAN:  62y Female with PMHx of Advanced Dementia, DM, hypothyroidism, HTN who presented to the ED due to unresponsiveness in the setting of hypoglycemia, stroke code called in the ED due to confusion even with improvement of blood sugar. Plan discussed with DR. Angie mckenna, patient is on basaglar 30 units at home and was advised to take novolog but never started it. As per sunil mckenna patient was not started on orals as her c- peptide was low-nl 0.9     # Syncope/?Seizure in setting of hypoglycemia   # IDDM  # Hypothyroidism   # HTN  # Dementia   - Workup negative for acute stroke   - EEG negative  - C/w home amlodipine 10, Levothyroxine 112, Lisinopril 40, and atorvastatin 80 QD  - Home regimen is basaglar 30 units, novolog advised but does not take it   -basal 4 units, SSI prn     #Transaminitis, acute on chronic  -ALT>AST will withhold high dose statin for now  -no clear indication, rec to restart outpatient after monitoring off statin    #Urinary retention  -c/w flomax    # DVT prophylaxis   -lovenox    # Diet   -dash/TLC    # Activity Score (AM-PAC)  -basic/daily 12    #Progress Note Handoff  Pending (specify):  Family discussion:   Disposition:   -riddhijoanne marla, medically cleared for dispo

## 2023-05-23 NOTE — PROGRESS NOTE ADULT - ATTENDING COMMENTS
***My note supersedes any discrepancies that may be above in the resident's note***    62y Female with PMHx of Advanced Dementia, DM, hypothyroidism, HTN who presented to the ED due to unresponsiveness in the setting of hypoglycemia, stroke code called in the ED due to confusion even with improvement of blood sugar. Plan discussed with DR. Angie mckenna, patient is on basaglar 30 units at home and was advised to take novolog but never started it. As per sunil mckenna patient was not started on orals as her c- peptide was low-nl 0.9     # Syncope/?Seizure in setting of hypoglycemia   # IDDM  # Hypothyroidism   # HTN  # Dementia   - Workup negative for acute stroke   - EEG negative  - C/w home amlodipine 10, Levothyroxine 112, Lisinopril 40, and atorvastatin 80 QD  - Home regimen is basaglar 30 units, novolog advised but does not take it   -basal 4 units, SSI prn     #Transaminitis, acute on chronic  -ALT>AST will withhold high dose statin for now  -no clear indication, rec to restart outpatient after monitoring off statin    #Urinary retention  -c/w flomax    # DVT prophylaxis   -lovenox    # Diet   -dash/TLC    # Activity Score (AM-PAC)  -basic/daily 12    #Progress Note Handoff  Pending (specify):  Family discussion:   Disposition: mariano gutiérrez, medically cleared for dispo

## 2023-05-24 LAB
ALBUMIN SERPL ELPH-MCNC: 2.9 G/DL — LOW (ref 3.5–5.2)
ALP SERPL-CCNC: 165 U/L — HIGH (ref 30–115)
ALT FLD-CCNC: 111 U/L — HIGH (ref 0–41)
ANION GAP SERPL CALC-SCNC: 9 MMOL/L — SIGNIFICANT CHANGE UP (ref 7–14)
AST SERPL-CCNC: 57 U/L — HIGH (ref 0–41)
BILIRUB SERPL-MCNC: 0.3 MG/DL — SIGNIFICANT CHANGE UP (ref 0.2–1.2)
BUN SERPL-MCNC: 16 MG/DL — SIGNIFICANT CHANGE UP (ref 10–20)
CALCIUM SERPL-MCNC: 9.1 MG/DL — SIGNIFICANT CHANGE UP (ref 8.4–10.5)
CHLORIDE SERPL-SCNC: 105 MMOL/L — SIGNIFICANT CHANGE UP (ref 98–110)
CO2 SERPL-SCNC: 27 MMOL/L — SIGNIFICANT CHANGE UP (ref 17–32)
CREAT SERPL-MCNC: 0.6 MG/DL — LOW (ref 0.7–1.5)
EGFR: 100 ML/MIN/1.73M2 — SIGNIFICANT CHANGE UP
GLUCOSE BLDC GLUCOMTR-MCNC: 117 MG/DL — HIGH (ref 70–99)
GLUCOSE BLDC GLUCOMTR-MCNC: 134 MG/DL — HIGH (ref 70–99)
GLUCOSE BLDC GLUCOMTR-MCNC: 202 MG/DL — HIGH (ref 70–99)
GLUCOSE BLDC GLUCOMTR-MCNC: 214 MG/DL — HIGH (ref 70–99)
GLUCOSE BLDC GLUCOMTR-MCNC: 264 MG/DL — HIGH (ref 70–99)
GLUCOSE SERPL-MCNC: 129 MG/DL — HIGH (ref 70–99)
POTASSIUM SERPL-MCNC: 4.5 MMOL/L — SIGNIFICANT CHANGE UP (ref 3.5–5)
POTASSIUM SERPL-SCNC: 4.5 MMOL/L — SIGNIFICANT CHANGE UP (ref 3.5–5)
PROT SERPL-MCNC: 5.8 G/DL — LOW (ref 6–8)
SODIUM SERPL-SCNC: 141 MMOL/L — SIGNIFICANT CHANGE UP (ref 135–146)

## 2023-05-24 PROCEDURE — 76705 ECHO EXAM OF ABDOMEN: CPT | Mod: 26

## 2023-05-24 PROCEDURE — 99232 SBSQ HOSP IP/OBS MODERATE 35: CPT

## 2023-05-24 RX ORDER — INSULIN LISPRO 100/ML
2 VIAL (ML) SUBCUTANEOUS ONCE
Refills: 0 | Status: COMPLETED | OUTPATIENT
Start: 2023-05-24 | End: 2023-05-24

## 2023-05-24 RX ADMIN — Medication 112 MICROGRAM(S): at 05:13

## 2023-05-24 RX ADMIN — Medication 3: at 11:41

## 2023-05-24 RX ADMIN — TAMSULOSIN HYDROCHLORIDE 0.4 MILLIGRAM(S): 0.4 CAPSULE ORAL at 21:40

## 2023-05-24 RX ADMIN — METFORMIN HYDROCHLORIDE 500 MILLIGRAM(S): 850 TABLET ORAL at 11:41

## 2023-05-24 RX ADMIN — Medication 81 MILLIGRAM(S): at 11:42

## 2023-05-24 RX ADMIN — Medication 2: at 16:56

## 2023-05-24 RX ADMIN — LISINOPRIL 40 MILLIGRAM(S): 2.5 TABLET ORAL at 05:13

## 2023-05-24 RX ADMIN — CHLORHEXIDINE GLUCONATE 1 APPLICATION(S): 213 SOLUTION TOPICAL at 05:13

## 2023-05-24 RX ADMIN — INSULIN GLARGINE 4 UNIT(S): 100 INJECTION, SOLUTION SUBCUTANEOUS at 11:41

## 2023-05-24 RX ADMIN — AMLODIPINE BESYLATE 10 MILLIGRAM(S): 2.5 TABLET ORAL at 05:13

## 2023-05-24 RX ADMIN — Medication 2 UNIT(S): at 21:40

## 2023-05-24 NOTE — PROGRESS NOTE ADULT - SUBJECTIVE AND OBJECTIVE BOX
LULI TAYLOR 62y Female  MRN#: [id number]     Hospital Day: 8d    Pt is currently admitted with the primary diagnosis of HYPOGLYCEMIA    Overnight events   -No major overnight events                                          ----------------------------------------------------------  OBJECTIVE  PAST MEDICAL & SURGICAL HISTORY  Asymptomatic hypertension    Borderline hypercholesterolemia    Adult hypothyroidism    Atypical diabetes mellitus                                              -----------------------------------------------------------  MEDICATIONS:  STANDING MEDICATIONS  amLODIPine   Tablet 10 milliGRAM(s) Oral daily  aspirin  chewable 81 milliGRAM(s) Oral daily  chlorhexidine 2% Cloths 1 Application(s) Topical <User Schedule>  dextrose 5%. 1000 milliLiter(s) IV Continuous <Continuous>  dextrose 5%. 1000 milliLiter(s) IV Continuous <Continuous>  dextrose 50% Injectable 25 Gram(s) IV Push once  dextrose 50% Injectable 12.5 Gram(s) IV Push once  dextrose 50% Injectable 25 Gram(s) IV Push once  enoxaparin Injectable 40 milliGRAM(s) SubCutaneous every 24 hours  glucagon  Injectable 1 milliGRAM(s) IntraMuscular once  insulin glargine Injectable (LANTUS) 4 Unit(s) SubCutaneous <User Schedule>  insulin lispro (ADMELOG) corrective regimen sliding scale   SubCutaneous three times a day before meals  levothyroxine 112 MICROGram(s) Oral daily  lisinopril 40 milliGRAM(s) Oral daily  metFORMIN 500 milliGRAM(s) Oral daily  NIFEdipine IR 10 milliGRAM(s) Oral once  tamsulosin 0.4 milliGRAM(s) Oral at bedtime    PRN MEDICATIONS  dextrose Oral Gel 15 Gram(s) Oral once PRN                                            ------------------------------------------------------------  VITAL SIGNS: Last 24 Hours  T(C): 36.1 (24 May 2023 04:59), Max: 36.1 (23 May 2023 20:00)  T(F): 97 (24 May 2023 04:59), Max: 97 (23 May 2023 20:00)  HR: 63 (24 May 2023 04:59) (59 - 63)  BP: 155/70 (24 May 2023 04:59) (16/68 - 189/83)  BP(mean): --  RR: 18 (24 May 2023 04:59) (18 - 18)  SpO2: --      05-23-23 @ 07:01  -  05-24-23 @ 07:00  --------------------------------------------------------  IN: 0 mL / OUT: 925 mL / NET: -925 mL                                             --------------------------------------------------------------  LABS:                        10.3   7.74  )-----------( 273      ( 23 May 2023 05:58 )             32.7     05-24    141  |  105  |  16  ----------------------------<  129<H>  4.5   |  27  |  0.6<L>    Ca    9.1      24 May 2023 09:50  Mg     2.1     05-23    TPro  5.8<L>  /  Alb  2.9<L>  /  TBili  0.3  /  DBili  x   /  AST  57<H>  /  ALT  111<H>  /  AlkPhos  165<H>  05-24                                                              --------------------------------------------------------------  PHYSICAL EXAM:  GENERAL: Awake, alert. Well-nourished, laying in bed appearing in no acute distress  HEENT: No FNDs, atraumatic, normocephalic  LUNGS: Clear to auscultation bilaterally  HEART: S1/S2. No heaves or thrills  ABD: Soft, non-tender, non-distended.  EXT/NEURO: Strength, sensation and ROM grossly intact.  SKIN: No edema    PLAN:  62y Female with PMHx of Advanced Dementia, DM, hypothyroidism, HTN who presented to the ED due to unresponsiveness in the setting of hypoglycemia, stroke code called in the ED due to confusion even with improvement of blood sugar. Plan discussed with DR. Angie mckenna, patient is on basaglar 30 units at home and was advised to take novolog but never started it. As per sunil mckenna patient was not started on orals as her c- peptide was low-nl 0.9     # Syncope/?Seizure in setting of hypoglycemia   # IDDM  # Hypothyroidism   # HTN  # Dementia   - Workup negative for acute stroke   - EEG negative  - C/w home amlodipine 10, Levothyroxine 112, Lisinopril 40, and atorvastatin 80 QD  - Home regimen is basaglar 30 units, novolog advised but does not take it   -basal 4 units, SSI prn on discharge     #Transaminitis, acute on chronic  -ALT>AST will withhold high dose statin for now  -no clear indication, rec to restart outpatient after monitoring off statin    #Urinary retention  -c/w flomax    # DVT prophylaxis   -lovenox    # Diet   -dash/TLC    # Activity Score (AM-PAC)  -basic/daily 12    #Progress Note Handoff  Pending (specify): bed  Family discussion:   Disposition:   -mariano gutiérrez, medically cleared for dispo

## 2023-05-24 NOTE — PROGRESS NOTE ADULT - ASSESSMENT
62y Female with PMHx of Advanced Dementia, DM, hypothyroidism, HTN who presented to the ED due to unresponsiveness in the setting of hypoglycemia, stroke code called in the ED due to confusion even with improvement of blood sugar. Plan discussed with DR. Angie mckenna, patient is on basaglar 30 units at home and was advised to take novolog but never started it. As per primary endo patient was not started on orals as her c- peptide was low-nl 0.9     # Syncope/?Seizure in setting of hypoglycemia   # IDDM  # Hypothyroidism   # HTN  # Dementia   - Baseline AOx2,  - PT consult,  consult   - Workup negative for acute stroke   - EEG negative  - will d/w endo outpt recs ?orals  - Fall precautions   - C/w home amlodipine 10, Levothyroxine 112, Lisinopril 40, and atorvastatin 80 QD  - Home regimen is basaglar 30 units, novolog advised but does not take it   - FS have been stable on just ISS  - needs  to administer Insulin  - f/u B12, RPR, ammonia, TSH  - as per , dementia is long standing and gradually progressing (needs help with all ADLs). As per , pt is mostly at baseline  -  w/ poor medical literacy.  - 5/22: appreciate endo f/u. Will start on Lantus 4u daily and Lispro SS  - outpt neuro f/u  - can change Norvasc to Procardia if BP high     #Urinary retention  cont bladder scans Q shift, Flomax      # DVT prophylaxis - Lovenox 40 QD   # GI prophylaxis  - Not Indicated   # Diet - DASH/TLC/CC  # Activity Score (AM-PAC) - IAT  # Code status - Full.    Dispo; pt is medically stable for discharge. Awaiting SNF auth    #Progress Note Handoff  Pending: Clinical improvement and stability__x___PT____x__  Pt/Family discussion:  informed and agrees with the current plan  Disposition:  SNF    My note supersedes the residents note should a discrepancy arise.    Chart and notes personally reviewed.  Care Discussed with Consultants/Other Providers/ Housestaff [ x] YES [ ] NO   Radiology, labs, old records personally reviewed.    Time-based billing (NON-critical care).     35 minutes spent on total encounter. The necessity of the time spent during the encounter on this date of service was due to:     time spent on review of labs, imaging studies, old records, obtaining history, personally examining patient, counselling and communicating with patient/ family, entering orders for medications/tests/etc, discussions with other health care providers, documentation in electronic health records, independent interpretation of labs, imaging/procedure results and care coordination.

## 2023-05-24 NOTE — CHART NOTE - NSCHARTNOTEFT_GEN_A_CORE
Registered Dietitian Follow-Up     Patient Profile Reviewed                           Yes [x]   No []     Nutrition History Previously Obtained        Yes []  No [x]       Pertinent Subjective Information: Per staff, pt consumed between 51-75% of meals provided in-house. Total feed required. No nausea or vomiting reported.     Pertinent Medical Information: 63 y/o female with PMHx of Advanced Dementia, DM, hypothyroidism, HTN who presented to the ED due to unresponsiveness in the setting of hypoglycemia, stroke code called in the ED due to confusion even with improvement of blood sugar.     Per MD note on 5/23: Disposition: mariano gutiérrez, medically cleared for dispo.     Diet order: Diet, DASH/TLC:   Sodium & Cholesterol Restricted  Consistent Carbohydrate {Evening Snack}  Soft and Bite Sized (SOFTBTSZ)  Supplement Feeding Modality:  Oral  Glucerna Shake Cans or Servings Per Day:  3       Frequency:  Daily (05-19-23 @ 13:43) [Active]     Anthropometrics:  - Ht: 160 cm  - Wt: 66.8 KG   - BMI: 26.1  - IBW:      Pertinent Lab Data: 5/23 @ 05:58 - RBC 3.84; H/H 10.3/32.7; Cr 0.6; ; Alk Phos 164; AST/SGOT 75; ALT/SGPT 143     Pertinent Meds:  MEDICATIONS  (STANDING):  amLODIPine   Tablet 10 milliGRAM(s) Oral daily  aspirin  chewable 81 milliGRAM(s) Oral daily  chlorhexidine 2% Cloths 1 Application(s) Topical <User Schedule>  dextrose 5%. 1000 milliLiter(s) (50 mL/Hr) IV Continuous <Continuous>  dextrose 5%. 1000 milliLiter(s) (100 mL/Hr) IV Continuous <Continuous>  dextrose 50% Injectable 25 Gram(s) IV Push once  dextrose 50% Injectable 12.5 Gram(s) IV Push once  dextrose 50% Injectable 25 Gram(s) IV Push once  enoxaparin Injectable 40 milliGRAM(s) SubCutaneous every 24 hours  glucagon  Injectable 1 milliGRAM(s) IntraMuscular once  insulin glargine Injectable (LANTUS) 4 Unit(s) SubCutaneous <User Schedule>  insulin lispro (ADMELOG) corrective regimen sliding scale   SubCutaneous three times a day before meals  levothyroxine 112 MICROGram(s) Oral daily  lisinopril 40 milliGRAM(s) Oral daily  metFORMIN 500 milliGRAM(s) Oral daily  NIFEdipine IR 10 milliGRAM(s) Oral once  tamsulosin 0.4 milliGRAM(s) Oral at bedtime    MEDICATIONS  (PRN):  dextrose Oral Gel 15 Gram(s) Oral once PRN Blood Glucose LESS THAN 70 milliGRAM(s)/deciliter    Physical Findings:  - Appearance: confused/disoriented per flow sheet  - GI function: last BM on 5/21  - Tubes: n/a   - Oral/Mouth cavity: tolerating soft + bite sized texture  - Skin: no edema noted; no pressure injuries noted    Nutrition Requirements  Weight Used: Using ABW 66.8 KG -- with consideration for age, BMI    Estimated Energy Needs    Continue [x]  Adjust []  ENERGY: 0087-2502 kcal/day (MSJ 1191.45* 1.1-1.2 SF)     Estimated Protein Needs    Continue [x]  Adjust []  PROTEIN: 67-80 g/day (1-1.2 g/kg)     Estimated Fluid Needs        Continue [x]  Adjust []  FLUID: 1934-6821 mL/day (25-30 mL/kg)     [x] Previous Nutrition Diagnosis: Inadequate Oral Intake             [x] Ongoing          [] Resolved    [] No active nutrition diagnosis identified at this time     Nutrition Intervention: Meals, snacks, medical nutrition supplements     Goal/Expected Outcome: Pt to meet >75% of estimated needs within 5-7 days     Indicator/Monitoring: Diet order, PO intake, weights, labs, NFPF, body composition, BM and tolerance to medical food supplements    Recommendations:  1. Continue with current diet order + supplements  2. Encourage PO intake and provide total feed    Pt is at moderate nutrition risk; will f/u in 5-7 days or prn.    RD to remain available: Maya Vera [phone] or [phone]

## 2023-05-24 NOTE — PROGRESS NOTE ADULT - SUBJECTIVE AND OBJECTIVE BOX
Patient is a 62y old  Female who presents with a chief complaint of Hypoglycemia     (19 May 2023 13:20)    INTERVAL HPI/OVERNIGHT EVENTS: Patient was examined and seen at bedside. This morning pt is resting comfortably in bed and reports no new issues or overnight events. No complaints. Poor historian. Worked w/ PT and ambulated >50ft. Eating better. Bladder scans w/out significant residual.  ROS: Denies CP, SOB, AP, new weakness  All other systems reviewed and are within normal limits.  InitialHPI:  HPI: 62y Female with PMHx of Advanced Dementia, DM, hypothyroidism, HTN who presented to the ED due to unresponsiveness in the setting of hypoglycemia, stroke code called in the ED due to confusion even with improvement of blood sugar. The patient's  saw her normal at 5am this morning when he checked her blood sugar which was 80 gave her insulin & levothyroxine and when he came back at 1130am she was unresponsive and foaming from the mouth. Checked her blood sugar which was in the 30s at this time so he called 911. When EMS arrived patient was diaphoretic and cold to the touch, FS was 47 at this time, therefore, dextrose and orange juice were given. Patient became more awake, however, confused and agitated.  states that she has had issues with hypoglycemia in the past, however, this is the most unresponsive she has been secondary to hypoglycemia. No history of seizures.   On arrival to the ED, patient moving all extremities antigravity with right gaze preference which spontaneously overcome. Patient required 2mg of Versed to tolerate CT scan.      In the ER:  Vitals: /80, HR 75, RR 14, T 36.8 oral, SpO2 98% RA  EKG: Sinus Rythm with 1st degree AV block   Sig Labs: Hgb 9.9, Cr 0.5, Trop -ve   Imagin) CXR Left lung opacification is felt to be artifactual in nature. However, a true frontal x-ray is recommended for further evaluation  2) CT Head + Angio: No acute infarct  (16 May 2023 16:39)    PAST MEDICAL & SURGICAL HISTORY:  Asymptomatic hypertension      Borderline hypercholesterolemia      Adult hypothyroidism      Atypical diabetes mellitus          General: NAD, AAOx2  HEENT:  EOMI, no LAD  CV: S1 S2  Resp: decreased breath sounds at bases  GI: NT/ND/S +BS  MS: no clubbing/cyanosis/edema, + pulses b/l  Neuro: nonfocal, +reflexes thruout          Home Medications:  amLODIPine 10 mg oral tablet: 1 tab(s) orally once a day (2019 08:59)  Synthroid 112 mcg (0.112 mg) oral tablet: 1 tab(s) orally once a day (2019 08:59)  tamsulosin 0.4 mg oral capsule: 1 cap(s) orally once a day (at bedtime) (22 May 2023 15:56)    MEDICATIONS  (STANDING):  amLODIPine   Tablet 10 milliGRAM(s) Oral daily  aspirin  chewable 81 milliGRAM(s) Oral daily  chlorhexidine 2% Cloths 1 Application(s) Topical <User Schedule>  dextrose 5%. 1000 milliLiter(s) (50 mL/Hr) IV Continuous <Continuous>  dextrose 5%. 1000 milliLiter(s) (100 mL/Hr) IV Continuous <Continuous>  dextrose 50% Injectable 25 Gram(s) IV Push once  dextrose 50% Injectable 12.5 Gram(s) IV Push once  dextrose 50% Injectable 25 Gram(s) IV Push once  enoxaparin Injectable 40 milliGRAM(s) SubCutaneous every 24 hours  glucagon  Injectable 1 milliGRAM(s) IntraMuscular once  insulin glargine Injectable (LANTUS) 4 Unit(s) SubCutaneous <User Schedule>  insulin lispro (ADMELOG) corrective regimen sliding scale   SubCutaneous three times a day before meals  levothyroxine 112 MICROGram(s) Oral daily  lisinopril 40 milliGRAM(s) Oral daily  NIFEdipine IR 10 milliGRAM(s) Oral once  tamsulosin 0.4 milliGRAM(s) Oral at bedtime    MEDICATIONS  (PRN):  dextrose Oral Gel 15 Gram(s) Oral once PRN Blood Glucose LESS THAN 70 milliGRAM(s)/deciliter    Vital Signs Last 24 Hrs  T(C): 35.7 (24 May 2023 13:37), Max: 36.1 (23 May 2023 20:00)  T(F): 96.3 (24 May 2023 13:37), Max: 97 (23 May 2023 20:00)  HR: 60 (24 May 2023 13:37) (59 - 63)  BP: 152/68 (24 May 2023 13:37) (152/68 - 189/83)  BP(mean): --  RR: 19 (24 May 2023 13:37) (18 - 19)  SpO2: --      CAPILLARY BLOOD GLUCOSE      POCT Blood Glucose.: 202 mg/dL (24 May 2023 16:37)  POCT Blood Glucose.: 264 mg/dL (24 May 2023 11:37)  POCT Blood Glucose.: 134 mg/dL (24 May 2023 07:41)  POCT Blood Glucose.: 117 mg/dL (24 May 2023 05:19)  POCT Blood Glucose.: 89 mg/dL (23 May 2023 22:18)    LABS:                        10.3   7.74  )-----------( 273      ( 23 May 2023 05:58 )             32.7     05-    141  |  105  |  16  ----------------------------<  129<H>  4.5   |  27  |  0.6<L>    Ca    9.1      24 May 2023 09:50  Mg     2.1     -    TPro  5.8<L>  /  Alb  2.9<L>  /  TBili  0.3  /  DBili  x   /  AST  57<H>  /  ALT  111<H>  /  AlkPhos  165<H>  05-24    LIVER FUNCTIONS - ( 24 May 2023 09:50 )  Alb: 2.9 g/dL / Pro: 5.8 g/dL / ALK PHOS: 165 U/L / ALT: 111 U/L / AST: 57 U/L / GGT: x                           Consultant Notes Reviewed:  [x ] YES  [ ] NO  Care Discussed with Consultants/Other Providers/ Housestaff [ x] YES  [ ] NO  Radiology, labs, new studies personally reviewed.

## 2023-05-25 VITALS — WEIGHT: 147.27 LBS | HEIGHT: 63 IN

## 2023-05-25 LAB
GLUCOSE BLDC GLUCOMTR-MCNC: 142 MG/DL — HIGH (ref 70–99)
GLUCOSE BLDC GLUCOMTR-MCNC: 201 MG/DL — HIGH (ref 70–99)
GLUCOSE BLDC GLUCOMTR-MCNC: 353 MG/DL — HIGH (ref 70–99)
SARS-COV-2 RNA SPEC QL NAA+PROBE: SIGNIFICANT CHANGE UP

## 2023-05-25 PROCEDURE — 99239 HOSP IP/OBS DSCHRG MGMT >30: CPT

## 2023-05-25 RX ADMIN — Medication 112 MICROGRAM(S): at 05:26

## 2023-05-25 RX ADMIN — Medication 5: at 17:15

## 2023-05-25 RX ADMIN — CHLORHEXIDINE GLUCONATE 1 APPLICATION(S): 213 SOLUTION TOPICAL at 05:27

## 2023-05-25 RX ADMIN — LISINOPRIL 40 MILLIGRAM(S): 2.5 TABLET ORAL at 05:25

## 2023-05-25 RX ADMIN — Medication 2: at 12:10

## 2023-05-25 RX ADMIN — INSULIN GLARGINE 4 UNIT(S): 100 INJECTION, SOLUTION SUBCUTANEOUS at 12:14

## 2023-05-25 RX ADMIN — Medication 81 MILLIGRAM(S): at 12:13

## 2023-05-25 RX ADMIN — AMLODIPINE BESYLATE 10 MILLIGRAM(S): 2.5 TABLET ORAL at 05:26

## 2023-05-25 NOTE — PROGRESS NOTE ADULT - PROVIDER SPECIALTY LIST ADULT
Internal Medicine
Internal Medicine
Critical Care
Internal Medicine
Endocrinology
Hospitalist

## 2023-05-25 NOTE — PROGRESS NOTE ADULT - ASSESSMENT
62y Female with PMHx of Advanced Dementia, DM, hypothyroidism, HTN who presented to the ED due to unresponsiveness in the setting of hypoglycemia, stroke code called in the ED due to confusion even with improvement of blood sugar. Plan discussed with DR. Angie mckenna, patient is on basaglar 30 units at home and was advised to take novolog but never started it. As per primary endo patient was not started on orals as her c- peptide was low-nl 0.9     # Syncope/?Seizure in setting of hypoglycemia   # IDDM  # Hypothyroidism   # HTN  # Dementia   - Baseline AOx2,  - PT consult,  consult   - Workup negative for acute stroke   - EEG negative  - will d/w endo outpt recs ?orals  - Fall precautions   - C/w home amlodipine 10, Levothyroxine 112, Lisinopril 40, and atorvastatin 80 QD  - Home regimen is basaglar 30 units, novolog advised but does not take it   - FS have been stable on just ISS  - needs  to administer Insulin  - B12, RPR, ammonia, TSH all normal  - as per , dementia is long standing and gradually progressing (needs help with all ADLs). As per , pt is mostly at baseline  -  w/ poor medical literacy.  - 5/22: appreciate endo f/u. Will start on Lantus 4u daily and Lispro SS  - outpt neuro f/u  - can change Norvasc to Procardia if BP high     #Urinary retention  cont bladder scans Q shift, Flomax      # DVT prophylaxis - Lovenox 40 QD   # GI prophylaxis  - Not Indicated   # Diet - DASH/TLC/CC  # Activity Score (AM-PAC) - IAT  # Code status - Full.    Dispo; pt is medically stable for discharge. Asked to do P2P for denial of SNF benefits. Spoke to [facility] and was able to overturn the denial.      My note supersedes the residents note should a discrepancy arise.    Chart and notes personally reviewed.  Care Discussed with Consultants/Other Providers/ Housestaff [ x] YES [ ] NO   Radiology, labs, old records personally reviewed.    Time-based billing (NON-critical care).     35 minutes spent on total encounter. The necessity of the time spent during the encounter on this date of service was due to:     time spent on review of labs, imaging studies, old records, obtaining history, personally examining patient, counselling and communicating with patient/ family, entering orders for medications/tests/etc, discussions with other health care providers, documentation in electronic health records, independent interpretation of labs, imaging/procedure results and care coordination.

## 2023-05-25 NOTE — CHART NOTE - NSCHARTNOTEFT_GEN_A_CORE
<<<RESIDENT DISCHARGE NOTE>>>     LULI TAYLOR  [id number]    VITAL SIGNS:  T(F): 96.6 (05-25-23 @ 04:10), Max: 98.3 (05-24-23 @ 20:00)  HR: 67 (05-25-23 @ 04:10)  BP: 149/67 (05-25-23 @ 04:10)  SpO2: --  Weight (kg): 66.8 (05-24-23 @ 17:38)  BMI (kg/m2): 26.1 (05-24-23 @ 17:38)    PHYSICAL EXAMINATION:  GENERAL: Awake, alert. Well-nourished, laying in bed appearing in no acute distress  HEENT: No FNDs, atraumatic, normocephalic  LUNGS: Clear to auscultation bilaterally  HEART: S1/S2. No heaves or thrills  ABD: Soft, non-tender, non-distended.  EXT/NEURO: Strength, sensation and ROM grossly intact.  SKIN: No edema      TEST RESULTS:      05-24    141  |  105  |  16  ----------------------------<  129<H>  4.5   |  27  |  0.6<L>    Ca    9.1      24 May 2023 09:50    TPro  5.8<L>  /  Alb  2.9<L>  /  TBili  0.3  /  DBili  x   /  AST  57<H>  /  ALT  111<H>  /  AlkPhos  165<H>  05-24      FINAL DISCHARGE INTERVIEW:  Resident(s) Present: (Name:__Levar Petersen___________), RN Present: (Name:  ___________)    DISCHARGE MEDICATION RECONCILIATION  reviewed with Attending (Name:___Darryl________)    DISPOSITION:   [ X  ] Home,    [  ] Home with Visiting Nursing Services,   [    ]  SNF/ NH,    [   ] Acute Rehab (4A),   [   ] Other (Specify:_________)

## 2023-05-25 NOTE — PROGRESS NOTE ADULT - SUBJECTIVE AND OBJECTIVE BOX
Patient is a 62y old  Female who presents with a chief complaint of Hypoglycemia     (19 May 2023 13:20)    INTERVAL HPI/OVERNIGHT EVENTS: Patient was examined and seen at bedside. This morning pt is resting comfortably in bed and reports no new issues or overnight events. No complaints. Poor historian. Eating better. Bladder scans w/out significant residual.  ROS: Denies CP, SOB, AP, new weakness  All other systems reviewed and are within normal limits.  InitialHPI:  HPI: 62y Female with PMHx of Advanced Dementia, DM, hypothyroidism, HTN who presented to the ED due to unresponsiveness in the setting of hypoglycemia, stroke code called in the ED due to confusion even with improvement of blood sugar. The patient's  saw her normal at 5am this morning when he checked her blood sugar which was 80 gave her insulin & levothyroxine and when he came back at 1130am she was unresponsive and foaming from the mouth. Checked her blood sugar which was in the 30s at this time so he called 911. When EMS arrived patient was diaphoretic and cold to the touch, FS was 47 at this time, therefore, dextrose and orange juice were given. Patient became more awake, however, confused and agitated.  states that she has had issues with hypoglycemia in the past, however, this is the most unresponsive she has been secondary to hypoglycemia. No history of seizures.   On arrival to the ED, patient moving all extremities antigravity with right gaze preference which spontaneously overcome. Patient required 2mg of Versed to tolerate CT scan.      In the ER:  Vitals: /80, HR 75, RR 14, T 36.8 oral, SpO2 98% RA  EKG: Sinus Rythm with 1st degree AV block   Sig Labs: Hgb 9.9, Cr 0.5, Trop -ve   Imagin) CXR Left lung opacification is felt to be artifactual in nature. However, a true frontal x-ray is recommended for further evaluation  2) CT Head + Angio: No acute infarct  (16 May 2023 16:39)    PAST MEDICAL & SURGICAL HISTORY:  Asymptomatic hypertension      Borderline hypercholesterolemia      Adult hypothyroidism      Atypical diabetes mellitus          General: NAD, AAOx2  HEENT:  EOMI, no LAD  CV: S1 S2  Resp: decreased breath sounds at bases  GI: NT/ND/S +BS  MS: no clubbing/cyanosis/edema, + pulses b/l  Neuro: nonfocal, +reflexes thruout            Home Medications:  amLODIPine 10 mg oral tablet: 1 tab(s) orally once a day (2019 08:59)  Synthroid 112 mcg (0.112 mg) oral tablet: 1 tab(s) orally once a day (2019 08:59)  tamsulosin 0.4 mg oral capsule: 1 cap(s) orally once a day (at bedtime) (22 May 2023 15:56)    MEDICATIONS  (STANDING):  amLODIPine   Tablet 10 milliGRAM(s) Oral daily  aspirin  chewable 81 milliGRAM(s) Oral daily  chlorhexidine 2% Cloths 1 Application(s) Topical <User Schedule>  dextrose 5%. 1000 milliLiter(s) (50 mL/Hr) IV Continuous <Continuous>  dextrose 5%. 1000 milliLiter(s) (100 mL/Hr) IV Continuous <Continuous>  dextrose 50% Injectable 25 Gram(s) IV Push once  dextrose 50% Injectable 12.5 Gram(s) IV Push once  dextrose 50% Injectable 25 Gram(s) IV Push once  enoxaparin Injectable 40 milliGRAM(s) SubCutaneous every 24 hours  glucagon  Injectable 1 milliGRAM(s) IntraMuscular once  insulin glargine Injectable (LANTUS) 4 Unit(s) SubCutaneous <User Schedule>  insulin lispro (ADMELOG) corrective regimen sliding scale   SubCutaneous three times a day before meals  levothyroxine 112 MICROGram(s) Oral daily  lisinopril 40 milliGRAM(s) Oral daily  NIFEdipine IR 10 milliGRAM(s) Oral once  tamsulosin 0.4 milliGRAM(s) Oral at bedtime    MEDICATIONS  (PRN):  dextrose Oral Gel 15 Gram(s) Oral once PRN Blood Glucose LESS THAN 70 milliGRAM(s)/deciliter    Vital Signs Last 24 Hrs  T(C): 36.6 (25 May 2023 13:40), Max: 36.8 (24 May 2023 20:00)  T(F): 97.8 (25 May 2023 13:40), Max: 98.3 (24 May 2023 20:00)  HR: 70 (25 May 2023 13:40) (65 - 70)  BP: 127/60 (25 May 2023 13:40) (127/60 - 149/67)  BP(mean): --  RR: 20 (25 May 2023 13:40) (18 - 20)  SpO2: --    Parameters below as of 25 May 2023 13:40  Patient On (Oxygen Delivery Method): room air      CAPILLARY BLOOD GLUCOSE      POCT Blood Glucose.: 353 mg/dL (25 May 2023 16:22)  POCT Blood Glucose.: 201 mg/dL (25 May 2023 11:06)  POCT Blood Glucose.: 142 mg/dL (25 May 2023 07:47)  POCT Blood Glucose.: 214 mg/dL (24 May 2023 20:57)    LABS:        141  |  105  |  16  ----------------------------<  129<H>  4.5   |  27  |  0.6<L>    Ca    9.1      24 May 2023 09:50    TPro  5.8<L>  /  Alb  2.9<L>  /  TBili  0.3  /  DBili  x   /  AST  57<H>  /  ALT  111<H>  /  AlkPhos  165<H>      LIVER FUNCTIONS - ( 24 May 2023 09:50 )  Alb: 2.9 g/dL / Pro: 5.8 g/dL / ALK PHOS: 165 U/L / ALT: 111 U/L / AST: 57 U/L / GGT: x                           Consultant Notes Reviewed:  [x ] YES  [ ] NO  Care Discussed with Consultants/Other Providers/ Housestaff [ x] YES  [ ] NO  Radiology, labs, new studies personally reviewed.

## 2023-05-26 NOTE — ED ADULT NURSE NOTE - CCCP TRG CHIEF CMPLNT
Detail Level: Detailed Add 01745 Cpt? (Important Note: In 2017 The Use Of 73516 Is Being Tracked By Cms To Determine Future Global Period Reimbursement For Global Periods): no hypoglycemia

## 2023-05-31 NOTE — CDI QUERY NOTE - NSCDIOTHERTXTBX_GEN_ALL_CORE_HH
Based on your professional judgment and the clinical indicators below, please clarify if the patient’s altered mental status and TME can be further specified as:  • Altered mental status associated with toxic metabolic encephalopathy  • Other (please specify):  • Clinically unable to determine      CLINICAL INDICATORS    5/18 Critical Care Progress Note: …Altered MS/ TME…     5/20 Discharge Note Provider: …Pt monitored in house over 5 days for AMS likely secondary to overdosing of insulin. Pt with improving fingersticks and mental status (poor at baseline) over hospital Admission. Pt was evaluated by endocrinology, recommendations received and uptitrated on insulin in house with precaution… stable for discharge with close outpt endocrine follow up and new anti-hyperglycemic medication regimen…    Radiology Results:  • 5/16 CT Head Impression: …No acute intracranial hemorrhage or acute large territorial infarct…

## 2023-05-31 NOTE — CHART NOTE - NSCHARTNOTESELECT_GEN_ALL_CORE
Transfer Note
hypoglycemia/Event Note
Event Note
Event Note
Follow Up/Event Note
Retention/Event Note
Transfer Note

## 2023-06-02 DIAGNOSIS — E03.9 HYPOTHYROIDISM, UNSPECIFIED: ICD-10-CM
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
## 2023-06-02 DIAGNOSIS — R33.9 RETENTION OF URINE, UNSPECIFIED: ICD-10-CM

## 2023-06-02 DIAGNOSIS — G92.8 OTHER TOXIC ENCEPHALOPATHY: ICD-10-CM

## 2023-06-02 DIAGNOSIS — E88.09 OTHER DISORDERS OF PLASMA-PROTEIN METABOLISM, NOT ELSEWHERE CLASSIFIED: ICD-10-CM

## 2023-06-02 DIAGNOSIS — E11.649 TYPE 2 DIABETES MELLITUS WITH HYPOGLYCEMIA WITHOUT COMA: ICD-10-CM

## 2023-06-02 DIAGNOSIS — F03.90 UNSPECIFIED DEMENTIA WITHOUT BEHAVIORAL DISTURBANCE: ICD-10-CM

## 2023-06-02 DIAGNOSIS — I10 ESSENTIAL (PRIMARY) HYPERTENSION: ICD-10-CM

## 2023-06-02 DIAGNOSIS — D64.9 ANEMIA, UNSPECIFIED: ICD-10-CM

## 2023-06-02 DIAGNOSIS — E78.5 HYPERLIPIDEMIA, UNSPECIFIED: ICD-10-CM

## 2023-06-02 DIAGNOSIS — Z20.822 CONTACT WITH AND (SUSPECTED) EXPOSURE TO COVID-19: ICD-10-CM

## 2023-06-02 DIAGNOSIS — R74.01 ELEVATION OF LEVELS OF LIVER TRANSAMINASE LEVELS: ICD-10-CM

## 2023-06-02 DIAGNOSIS — Z79.4 LONG TERM (CURRENT) USE OF INSULIN: ICD-10-CM

## 2023-06-02 DIAGNOSIS — T38.3X1A POISONING BY INSULIN AND ORAL HYPOGLYCEMIC [ANTIDIABETIC] DRUGS, ACCIDENTAL (UNINTENTIONAL), INITIAL ENCOUNTER: ICD-10-CM

## 2024-02-05 ENCOUNTER — EMERGENCY (EMERGENCY)
Facility: HOSPITAL | Age: 64
LOS: 0 days | Discharge: HOME | End: 2024-02-05
Attending: STUDENT IN AN ORGANIZED HEALTH CARE EDUCATION/TRAINING PROGRAM
Payer: COMMERCIAL

## 2024-02-05 VITALS
TEMPERATURE: 98 F | OXYGEN SATURATION: 97 % | DIASTOLIC BLOOD PRESSURE: 103 MMHG | WEIGHT: 149.91 LBS | SYSTOLIC BLOOD PRESSURE: 147 MMHG | HEIGHT: 65 IN | HEART RATE: 79 BPM

## 2024-02-05 DIAGNOSIS — E11.9 TYPE 2 DIABETES MELLITUS WITHOUT COMPLICATIONS: ICD-10-CM
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
## 2024-02-05 DIAGNOSIS — M79.675 PAIN IN LEFT TOE(S): ICD-10-CM

## 2024-02-05 DIAGNOSIS — S91.202A UNSPECIFIED OPEN WOUND OF LEFT GREAT TOE WITH DAMAGE TO NAIL, INITIAL ENCOUNTER: ICD-10-CM
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
## 2024-02-05 PROCEDURE — 99282 EMERGENCY DEPT VISIT SF MDM: CPT

## 2024-02-05 PROCEDURE — 99283 EMERGENCY DEPT VISIT LOW MDM: CPT

## 2024-02-05 NOTE — ED PROVIDER NOTE - PATIENT PORTAL LINK FT
You can access the FollowMyHealth Patient Portal offered by [facility] by registering at the following website: [email or web address]. By joining [employer]’s FollowMyHealth portal, you will also be able to view your health information using other applications (apps) compatible with our system.

## 2024-02-05 NOTE — ED ADULT NURSE NOTE - SUICIDE SCREENING QUESTION 3
Esther Diehl (: 1976) is a 50 y.o. female, established patient, here for evaluation of the following chief complaint(s):   Ear Pain ((L) ear), Sore Throat, and Shortness of Breath (Pt states she's been having some tightness in her chest)       ASSESSMENT/PLAN:  Below is the assessment and plan developed based on review of pertinent history, labs, studies, and medications. 1. Left ear pain  -     AMB POC KEISHA INFLUENZA A/B TEST  -     amoxicillin-clavulanate (AUGMENTIN) 875-125 mg per tablet; Take 1 Tablet by mouth every twelve (12) hours for 10 days. , Normal, Disp-20 Tablet, R-0  2. Sinus headache  -     AMB POC KEISHA INFLUENZA A/B TEST  -     amoxicillin-clavulanate (AUGMENTIN) 875-125 mg per tablet; Take 1 Tablet by mouth every twelve (12) hours for 10 days. , Normal, Disp-20 Tablet, R-0  3. Upper respiratory tract infection, unspecified type  -     AMB POC KEISHA INFLUENZA A/B TEST  4. Body aches  -     AMB POC KEISHA INFLUENZA A/B TEST  5. Low grade fever  -     AMB POC KEISHA INFLUENZA A/B TEST    Flu and strep negative. Will treat for URI/sinusitis/ ear infection given her complaints. Continue OTC symptom therapy    Return if symptoms worsen or fail to improve. SUBJECTIVE/OBJECTIVE:  HPI   For the past 2 weeks has had left ear pain and some lymph node swelling on left side of neck as well  Then beginning of this week ear started to burn deep inside, does not have fluid feeling or loss of hearing. Has had some nasal congestion but mucus is clear. She has a little sore throat but thinks its just due to the lymph node swelling, throat is not red . Last night had low grade fever around 100 and left sided headache behind her eye, also had body aches and fatigue. 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   She notes that she is Pretty sure that she has sinusitis or ear infection     She is taking motrin and tylenol and OTC cold medicine.     Review of Systems   Gen: no fatigue, fever, chills  Eyes: no excessive tearing, itching, or discharge  Nose: +rhinorrhea, no sinus pain  Mouth: no oral lesions, +sore throat  Resp: no shortness of breath, no wheezing, +cough  CV: no chest pain, no paroxysmal nocturnal dyspnea  Abd: no nausea, no heartburn, no diarrhea, no constipation, no abdominal pain  Neuro: no headaches, no syncope or presyncopal episodes  Endo: no polyuria, no polydipsia  Heme: no lymphadenopathy, no easy bruising or bleeding    No data recorded     Physical Exam    [INSTRUCTIONS:  \"[x]\" Indicates a positive item  \"[]\" Indicates a negative item  -- DELETE ALL ITEMS NOT EXAMINED]    Constitutional: [x] Appears well-developed and well-nourished [x] No apparent distress      [] Abnormal -     Mental status: [x] Alert and awake  [x] Oriented to person/place/time [x] Able to follow commands    [] Abnormal -     Eyes:   EOM    [x]  Normal    [] Abnormal -   Sclera  [x]  Normal    [] Abnormal -          Discharge [x]  None visible   [] Abnormal -     HENT: [x] Normocephalic, atraumatic  [] Abnormal -   [x] Mouth/Throat: Mucous membranes are moist    External Ears [x] Normal  [] Abnormal -    Neck: [x] No visualized mass [] Abnormal -     Pulmonary/Chest: [x] Respiratory effort normal   [x] No visualized signs of difficulty breathing or respiratory distress        [] Abnormal -      Musculoskeletal:   [x] Normal gait with no signs of ataxia         [x] Normal range of motion of neck        [] Abnormal -     Neurological:        [x] No Facial Asymmetry (Cranial nerve 7 motor function) (limited exam due to video visit)          [x] No gaze palsy        [] Abnormal -          Skin:        [x] No significant exanthematous lesions or discoloration noted on facial skin         [] Abnormal -            Psychiatric:       [x] Normal Affect [] Abnormal -        [x] No Hallucinations    Other pertinent observable physical exam findings:-            Results for orders placed or performed in visit on 01/14/22   AMB POC KEISHA INFLUENZA A/B TEST   Result Value Ref Range    VALID INTERNAL CONTROL POC Yes     Influenza A Ag POC Negative Negative    Influenza B Ag POC Negative Negative         Jason Bello, was evaluated through a synchronous (real-time) audio-video encounter. The patient (or guardian if applicable) is aware that this is a billable service. Verbal consent to proceed has been obtained within the past 12 months. The visit was conducted pursuant to the emergency declaration under the [address] [place] [address] authority and the [employer] Act. Patient identification was verified, and a caregiver was present when appropriate. The patient was located in a state where the provider was credentialed to provide care. An electronic signature was used to authenticate this note.   -- Mikki Hoyt NP No

## 2024-02-05 NOTE — ED PROVIDER NOTE - OBJECTIVE STATEMENT
63-year-old female with past medical history of diabetes presents to the ED for evaluation of toe pain.  Patient with several days of toe pain after her left great toenail fell off while showering with her aide.  There is no fall or direct trauma to the toe.  Patient had an appointment with her outpatient podiatrist but missed the appointment and was concerned prompting today's ED evaluation.  Denies any fever, chills, purulence, nausea, vomiting.

## 2024-02-05 NOTE — ED PROVIDER NOTE - NSFOLLOWUPINSTRUCTIONS_ED_ALL_ED_FT
Our Emergency Department Referral Coordinators will be reaching out to you in the next 24-48 hours from 9:00am to 5:00pm with a follow up appointment. Please expect a phone call from the hospital in that time frame. If you do not receive a call or if you have any questions or concerns, you can reach them at   [phone]      Foot Pain    Many things can cause foot pain. Some common causes are:     An injury.  A sprain.  Arthritis.  Blisters.  Bunions.    HOME CARE INSTRUCTIONS  Pay attention to any changes in your symptoms. Take these actions to help with your discomfort:    If directed, put ice on the affected area:  Put ice in a plastic bag.  Place a towel between your skin and the bag.  Leave the ice on for 15–20 minutes, 3?4 times a day for 2 days.  Take over-the-counter and prescription medicines only as told by your health care provider.  Wear comfortable, supportive shoes that fit you well. Do not wear high heels.  Do not stand or walk for long periods of time.  Do not lift a lot of weight. This can put added pressure on your feet.  Do stretches to relieve foot pain and stiffness as told by your health care provider.  Rub your foot gently.  Keep your feet clean and dry.    SEEK MEDICAL CARE IF:  Your pain does not get better after a few days of self-care.  Your pain gets worse.  You cannot stand on your foot.    SEEK IMMEDIATE MEDICAL CARE IF:  Your foot is numb or tingling.  Your foot or toes are swollen.  Your foot or toes turn white or blue.  You have warmth and redness along your foot.    ADDITIONAL NOTES AND INSTRUCTIONS    Please follow up with your Primary MD in 24-48 hr.  Seek immediate medical care for any new/worsening signs or symptoms.

## 2024-02-05 NOTE — ED PROVIDER NOTE - PHYSICAL EXAMINATION
CONST: Well appearing in NAD  MS: L foot: L great toe w/ nailbed exposed, no erythema and or purulence. NV intact. Non tender. Full ROM.   SKIN: Warm, dry, no acute rashes.   NEURO: A&Ox3, No focal deficits. Strength 5/5 with no sensory deficits. Steady gait w/ walker

## 2024-02-05 NOTE — ED ADULT NURSE NOTE - NSFALLRISKFACTORS_ED_ALL_ED
Beta Blocker Refill Protocol - 12 Month Protocol Failed 01/10/2023 03:42 AM   Protocol Details  eGFR within last 12 months looking at last value    Seen by prescribing provider or same department within the last 12 months or has a future appt in 3 months - IF FAILED PLEASE LOOK AT CHART REVIEW FOR LAST VISIT AND PROCEED ACCORDINGLY    Last BP was under 140/90 or if patient has diabetes, CAD, or PVD, BP under 130/80 in past year -- IF CRITERIA FAILED REFER TO PROTOCOL DETAILS    Last recorded pulse is greater than 49    Medication (including dose and sig) on current meds list    Request is NOT for Sotalol HCL        Future appt 3/15/23  Last OV 9/14/22  Per note 3/9/22 Continue current propranolol 40 mg twice daily  propRANolol (INDERAL) 40 MG tablet 60 tablet 3 7/29/2022     Sig: TAKE 1 TABLET BY MOUTH TWICE DAILY      Routed to Dr. Franklyn Burns due to failed protocol   No indicators present

## 2024-02-05 NOTE — ED PROVIDER NOTE - ATTENDING APP SHARED VISIT CONTRIBUTION OF CARE
63-year-old female with past medical history of diabetes presents to the ED for evaluation of toe pain. several days ago, pt's L great toe nail came off while with aid. no trauma. pt came for eval. no swelling/redness to toe. no numbness/weakness/fall.    vss  gen- NAD, aaox3  card-rrr  lungs-ctab, no wheezing or rhonchi  neuro- full str/sensation, cn ii-xii grossly intact, normal coordination   L foot- great toe nail seperated from toe, no toe tenderness, erythema or swelling, DP 2+

## 2024-02-05 NOTE — ED PROVIDER NOTE - NSFOLLOWUPCLINICS_GEN_ALL_ED_FT
[facility]  Podiatry  .  [place]   Phone: [phone]  Fax:   Follow Up Time: 4-6 Days

## 2024-02-05 NOTE — ED ADULT TRIAGE NOTE - CHIEF COMPLAINT QUOTE
Pt. c/o L big toe nail coming off after the shower. Pt. denies injury/trauma. Pt. has hx of diabetes.

## 2024-02-06 NOTE — CHART NOTE - NSCHARTNOTEFT_GEN_A_CORE
podiatry follow up is scheduled for 2/7 at 230 with Dr. retana [phone]. Pt aware of appt details.

## 2024-02-07 ENCOUNTER — APPOINTMENT (OUTPATIENT)
Dept: PODIATRY | Facility: CLINIC | Age: 64
End: 2024-02-07
Payer: COMMERCIAL

## 2024-02-07 VITALS — HEIGHT: 65 IN | WEIGHT: 168 LBS | BODY MASS INDEX: 27.99 KG/M2

## 2024-02-07 DIAGNOSIS — E11.42 TYPE 2 DIABETES MELLITUS WITH DIABETIC POLYNEUROPATHY: ICD-10-CM

## 2024-02-07 DIAGNOSIS — L60.2 ONYCHOGRYPHOSIS: ICD-10-CM

## 2024-02-07 DIAGNOSIS — S99.929A UNSPECIFIED INJURY OF UNSPECIFIED FOOT, INITIAL ENCOUNTER: ICD-10-CM

## 2024-02-07 PROCEDURE — 11721 DEBRIDE NAIL 6 OR MORE: CPT

## 2024-02-07 PROCEDURE — G2211 COMPLEX E/M VISIT ADD ON: CPT

## 2024-02-07 PROCEDURE — 99203 OFFICE O/P NEW LOW 30 MIN: CPT | Mod: 25

## 2024-02-07 NOTE — PHYSICAL EXAM
[1+] : left foot dorsalis pedis 1+ [] : normal strength/tone [FreeTextEntry1] : L Hallux Nail missing, nail bed intact Elongated nails x10  [Diminished Throughout Right Foot] : diminished sensation with monofilament testing throughout right foot [Diminished Throughout Left Foot] : diminished sensation with monofilament testing throughout left foot

## 2024-02-07 NOTE — ASSESSMENT
[FreeTextEntry1] : nail bed L hallux - betadine/DSD Educated on proper diabetic foot care and shoe wear - Dbx of nails x9 - F/u PCP regarding DM - Rx Vascular  RTO 10-12 weeks for foot care or sooner if any problems arise

## 2024-02-07 NOTE — HISTORY OF PRESENT ILLNESS
[FreeTextEntry1] : L Hallux nail came off - Went to ED - Dressing with bacitracin/DSD DM, controlled with meds - Hospitalized for Hypoglycemia - Last A1c over 10 - Used to see a podiatrist for foot care, has not seen them recently

## 2024-07-03 ENCOUNTER — APPOINTMENT (OUTPATIENT)
Dept: PODIATRY | Facility: CLINIC | Age: 64
End: 2024-07-03
Payer: COMMERCIAL

## 2024-07-03 DIAGNOSIS — E11.42 TYPE 2 DIABETES MELLITUS WITH DIABETIC POLYNEUROPATHY: ICD-10-CM

## 2024-07-03 DIAGNOSIS — L60.2 ONYCHOGRYPHOSIS: ICD-10-CM

## 2024-07-03 PROCEDURE — 11721 DEBRIDE NAIL 6 OR MORE: CPT

## 2024-09-11 ENCOUNTER — APPOINTMENT (OUTPATIENT)
Dept: PODIATRY | Facility: CLINIC | Age: 64
End: 2024-09-11
Payer: COMMERCIAL

## 2024-09-11 DIAGNOSIS — E11.42 TYPE 2 DIABETES MELLITUS WITH DIABETIC POLYNEUROPATHY: ICD-10-CM

## 2024-09-11 DIAGNOSIS — L84 CORNS AND CALLOSITIES: ICD-10-CM

## 2024-09-11 DIAGNOSIS — L60.2 ONYCHOGRYPHOSIS: ICD-10-CM

## 2024-09-11 PROCEDURE — 11721 DEBRIDE NAIL 6 OR MORE: CPT | Mod: 59

## 2024-09-11 PROCEDURE — 11056 PARNG/CUTG B9 HYPRKR LES 2-4: CPT | Mod: 59

## 2024-09-11 NOTE — ASSESSMENT
[FreeTextEntry1] : Educated on proper diabetic foot care and shoe wear - Dbx of nails x9  -Dbx of Calluses x 2  - F/u PCP regarding DM RTO 10-12 weeks for foot care or sooner if any problems arise

## 2024-09-11 NOTE — REASON FOR VISIT
[Follow-Up Visit] : a follow-up visit for [Spouse] : spouse [FreeTextEntry2] : L hallux Nail injury,

## 2024-09-11 NOTE — PHYSICAL EXAM
[1+] : left foot dorsalis pedis 1+ [] : normal strength/tone [FreeTextEntry1] : Elongated nails x9  Callus x 2  [Diminished Throughout Right Foot] : diminished sensation with monofilament testing throughout right foot [Diminished Throughout Left Foot] : diminished sensation with monofilament testing throughout left foot

## 2024-09-11 NOTE — HISTORY OF PRESENT ILLNESS
[FreeTextEntry1] : DM, controlled with meds - Last A1c over 10 - Foot Care, unable to cut her Toenails

## 2024-10-09 ENCOUNTER — APPOINTMENT (OUTPATIENT)
Dept: PODIATRY | Facility: CLINIC | Age: 64
End: 2024-10-09

## 2025-01-29 ENCOUNTER — APPOINTMENT (OUTPATIENT)
Dept: PODIATRY | Facility: CLINIC | Age: 65
End: 2025-01-29

## 2025-02-05 ENCOUNTER — APPOINTMENT (OUTPATIENT)
Dept: PODIATRY | Facility: CLINIC | Age: 65
End: 2025-02-05
Payer: MEDICARE

## 2025-02-05 DIAGNOSIS — E11.42 TYPE 2 DIABETES MELLITUS WITH DIABETIC POLYNEUROPATHY: ICD-10-CM

## 2025-02-05 DIAGNOSIS — L60.2 ONYCHOGRYPHOSIS: ICD-10-CM

## 2025-02-05 DIAGNOSIS — L84 CORNS AND CALLOSITIES: ICD-10-CM

## 2025-02-05 PROCEDURE — 11056 PARNG/CUTG B9 HYPRKR LES 2-4: CPT

## 2025-02-05 PROCEDURE — 11721 DEBRIDE NAIL 6 OR MORE: CPT | Mod: 59

## 2025-02-28 NOTE — CHART NOTE - NSCHARTNOTEFT_GEN_A_CORE
Transfer Note: CCU to Medical Floor  Transfer from: CCU    Transfer to:  (  ) CCU    (  ) MICU   (  ) Telemetry     (  ) RCU                               (  ) Palliative    (  ) Stroke Unit    ( X ) Medicine Floor      Follow up:  - f/u procalcitonin  - If FS > 150, begin Lantus 10u daily and low-dose sliding scale (as per endocrinology)    HPI / HOSPITAL COURSE:   62y Female with PMHx of Advanced Dementia, DM, hypothyroidism, HTN who presented to the ED due to unresponsiveness in the setting of hypoglycemia, stroke code called in the ED due to confusion even with improvement of blood sugar. The patient's  saw her normal at 5am this morning when he checked her blood sugar which was 80 gave her insulin & levothyroxine and when he came back at 1130am she was unresponsive and foaming from the mouth. Checked her blood sugar which was in the 30s at this time so he called 911. When EMS arrived patient was diaphoretic and cold to the touch, FS was 47 at this time, therefore, dextrose and orange juice were given. Patient became more awake, however, confused and agitated.  states that she has had issues with hypoglycemia in the past, however, this is the most unresponsive she has been secondary to hypoglycemia. No history of seizures.   On arrival to the ED, patient moving all extremities antigravity with right gaze preference which spontaneously overcome. Patient required 2mg of Versed to tolerate CT scan.      In the ER:  Vitals: /80, HR 75, RR 14, T 36.8 oral, SpO2 98% RA  EKG: Sinus Rythm with 1st degree AV block   Sig Labs: Hgb 9.9, Cr 0.5, Trop -ve   Imagin) CXR Left lung opacification is felt to be artifactual in nature. However, a true frontal x-ray is recommended for further evaluation  2) CT Head + Angio: No acute infarct         Vital Signs Last 24 Hrs  T(C): 36.3 (18 May 2023 08:00), Max: 36.8 (17 May 2023 20:00)  T(F): 97.3 (18 May 2023 08:00), Max: 98.3 (17 May 2023 20:00)  HR: 69 (18 May 2023 10:00) (60 - 83)  BP: 152/65 (18 May 2023 10:00) (116/59 - 187/70)  BP(mean): 94 (18 May 2023 10:00) (82 - 122)  RR: 22 (18 May 2023 10:00) (11 - 45)  SpO2: 97% (18 May 2023 10:00) (94% - 100%)    Parameters below as of 18 May 2023 10:00  Patient On (Oxygen Delivery Method): room air        I&O's Summary      Physical Exam:   General: NAD, awake and alert  Cardiac: RRR, no murmur, no rub, no gallop  Respiratory: Good air exchange bilaterally, no wheezes, no crackles  GI: Abdomen nondistended, nontender, bowel sounds present  Neuro: AAOx3, no tremors, no fasciculations     LABS:   CARDIAC MARKERS ( 16 May 2023 14:15 )  x     / <0.01 ng/mL / 197 U/L / x     / x                                  10.8   8.31  )-----------( 275      ( 18 May 2023 04:54 )             35.5       05-18    143  |  108  |  7<L>  ----------------------------<  133<H>  4.1   |  27  |  0.5<L>    Ca    9.8      18 May 2023 04:54  Mg     1.9     05-18    TPro  6.5  /  Alb  3.5  /  TBili  0.4  /  DBili  x   /  AST  29  /  ALT  45<H>  /  AlkPhos  110  05-18      PT/INR - ( 16 May 2023 14:15 )   PT: 10.70 sec;   INR: 0.94 ratio         PTT - ( 16 May 2023 14:15 )  PTT:35.4 sec      ASSESSMENT & PLAN:  62y Female with PMHx of Advanced Dementia, DM, hypothyroidism, HTN who presented to the ED due to unresponsiveness in the setting of hypoglycemia, stroke code called in the ED due to confusion even with improvement of blood sugar. PAtient is unable to provide a history,  was unreachqable. Plan discussed with DR. Angie mckenna, patient is on basaglar 30 units at home and was advised to take novolog but never started it. As per  sunil mckenna patient was not started on orals as her c- peptide was low 0.9     # Syncope in setting of hypoglycemia   # IDDM  # Hypothyroidism   # HTN  # Advanced Dementia   - Patient clinically and hemodynamically stable on admission   - Baseline AOx2, seeking placement from family's end   - PT consult,  consult   - Workup negative for acute stroke   - EEG per neurology   - C/w home insulin 30 at bedtime + SS  - A1C, TSH, Lipid Profile in AM   - Fall precautions   - C/w home amlodipine 10, Levothyroxine 112, Lisinopril 40, and atorvastatin 80 QD  - Home regimen is basaglar 30 units, novolog advised but does not take it    LAst a1c 9.5 in 2022, recommend to obtain a repeat level  -blood glucose readings trended to 600 on d10, can stop the same, if blood glucose readings remain elevated above 200 off the drip can start with lower dose  lantus 10 units and lispro 1 unit for every 50 mg/dl above 150 mg/dl , tirate as per response  - DC recs to be determined.     # DVT prophylaxis - Lovenox 40 QD   # GI prophylaxis  - Not Indicated   # Diet - DASH/TLC/CC  # Activity Score (AM-PAC) - IAT  # Code status - Full normal/well-groomed/no distress

## 2025-05-07 ENCOUNTER — APPOINTMENT (OUTPATIENT)
Dept: PODIATRY | Facility: CLINIC | Age: 65
End: 2025-05-07
Payer: MEDICARE

## 2025-05-07 DIAGNOSIS — E11.9 TYPE 2 DIABETES MELLITUS W/OUT COMPLICATIONS: ICD-10-CM

## 2025-05-07 DIAGNOSIS — L84 CORNS AND CALLOSITIES: ICD-10-CM

## 2025-05-07 DIAGNOSIS — E11.42 TYPE 2 DIABETES MELLITUS WITH DIABETIC POLYNEUROPATHY: ICD-10-CM

## 2025-05-07 DIAGNOSIS — L60.2 ONYCHOGRYPHOSIS: ICD-10-CM

## 2025-05-07 PROCEDURE — 11056 PARNG/CUTG B9 HYPRKR LES 2-4: CPT

## 2025-05-07 PROCEDURE — 11721 DEBRIDE NAIL 6 OR MORE: CPT | Mod: 59

## 2025-07-01 NOTE — ED PROVIDER NOTE - NS ED MD DISPO SPECIAL CONSIDERATION1
No. TIFFANI screening performed.  STOP BANG Legend: 0-2 = LOW Risk; 3-4 = INTERMEDIATE Risk; 5-8 = HIGH Risk
None

## 2025-08-18 ENCOUNTER — APPOINTMENT (OUTPATIENT)
Dept: PODIATRY | Facility: HOSPITAL | Age: 65
End: 2025-08-18

## 2025-08-21 ENCOUNTER — APPOINTMENT (OUTPATIENT)
Dept: PODIATRY | Facility: HOSPITAL | Age: 65
End: 2025-08-21

## 2025-08-25 ENCOUNTER — APPOINTMENT (OUTPATIENT)
Age: 65
End: 2025-08-25

## 2025-09-04 ENCOUNTER — APPOINTMENT (OUTPATIENT)
Facility: CLINIC | Age: 65
End: 2025-09-04
Payer: MEDICARE

## 2025-09-04 DIAGNOSIS — L84 CORNS AND CALLOSITIES: ICD-10-CM

## 2025-09-04 DIAGNOSIS — E11.9 TYPE 2 DIABETES MELLITUS W/OUT COMPLICATIONS: ICD-10-CM

## 2025-09-04 DIAGNOSIS — E11.42 TYPE 2 DIABETES MELLITUS WITH DIABETIC POLYNEUROPATHY: ICD-10-CM

## 2025-09-04 DIAGNOSIS — L60.2 ONYCHOGRYPHOSIS: ICD-10-CM

## 2025-09-04 PROCEDURE — 11721 DEBRIDE NAIL 6 OR MORE: CPT | Mod: 59

## 2025-09-04 PROCEDURE — 11056 PARNG/CUTG B9 HYPRKR LES 2-4: CPT
